# Patient Record
Sex: MALE | Race: WHITE | NOT HISPANIC OR LATINO | Employment: FULL TIME | URBAN - METROPOLITAN AREA
[De-identification: names, ages, dates, MRNs, and addresses within clinical notes are randomized per-mention and may not be internally consistent; named-entity substitution may affect disease eponyms.]

---

## 2021-09-28 ENCOUNTER — APPOINTMENT (OUTPATIENT)
Dept: PREADMISSION TESTING | Facility: HOSPITAL | Age: 56
End: 2021-09-28

## 2021-09-30 ENCOUNTER — PRE-ADMISSION TESTING (OUTPATIENT)
Dept: PREADMISSION TESTING | Facility: HOSPITAL | Age: 56
End: 2021-09-30

## 2021-09-30 VITALS — HEIGHT: 68 IN | WEIGHT: 257.8 LBS | BODY MASS INDEX: 39.07 KG/M2

## 2021-09-30 LAB
ANION GAP SERPL CALCULATED.3IONS-SCNC: 16 MMOL/L (ref 5–15)
BUN SERPL-MCNC: 11 MG/DL (ref 6–20)
BUN/CREAT SERPL: 14.7 (ref 7–25)
CALCIUM SPEC-SCNC: 9.5 MG/DL (ref 8.6–10.5)
CHLORIDE SERPL-SCNC: 101 MMOL/L (ref 98–107)
CO2 SERPL-SCNC: 22 MMOL/L (ref 22–29)
CREAT SERPL-MCNC: 0.75 MG/DL (ref 0.76–1.27)
DEPRECATED RDW RBC AUTO: 42.6 FL (ref 37–54)
ERYTHROCYTE [DISTWIDTH] IN BLOOD BY AUTOMATED COUNT: 11.8 % (ref 12.3–15.4)
GFR SERPL CREATININE-BSD FRML MDRD: 107 ML/MIN/1.73
GLUCOSE SERPL-MCNC: 94 MG/DL (ref 65–99)
HBA1C MFR BLD: 5.1 % (ref 4.8–5.6)
HCT VFR BLD AUTO: 44.4 % (ref 37.5–51)
HGB BLD-MCNC: 15.6 G/DL (ref 13–17.7)
MCH RBC QN AUTO: 34.5 PG (ref 26.6–33)
MCHC RBC AUTO-ENTMCNC: 35.1 G/DL (ref 31.5–35.7)
MCV RBC AUTO: 98.2 FL (ref 79–97)
PLATELET # BLD AUTO: 182 10*3/MM3 (ref 140–450)
PMV BLD AUTO: 9.4 FL (ref 6–12)
POTASSIUM SERPL-SCNC: 4.4 MMOL/L (ref 3.5–5.2)
QT INTERVAL: 418 MS
QTC INTERVAL: 473 MS
RBC # BLD AUTO: 4.52 10*6/MM3 (ref 4.14–5.8)
SODIUM SERPL-SCNC: 139 MMOL/L (ref 136–145)
WBC # BLD AUTO: 4.43 10*3/MM3 (ref 3.4–10.8)

## 2021-09-30 PROCEDURE — 93010 ELECTROCARDIOGRAM REPORT: CPT | Performed by: INTERNAL MEDICINE

## 2021-09-30 PROCEDURE — 80048 BASIC METABOLIC PNL TOTAL CA: CPT

## 2021-09-30 PROCEDURE — 85027 COMPLETE CBC AUTOMATED: CPT

## 2021-09-30 PROCEDURE — 83036 HEMOGLOBIN GLYCOSYLATED A1C: CPT

## 2021-09-30 PROCEDURE — 93005 ELECTROCARDIOGRAM TRACING: CPT

## 2021-09-30 PROCEDURE — 36415 COLL VENOUS BLD VENIPUNCTURE: CPT

## 2021-09-30 RX ORDER — MELOXICAM 15 MG/1
15 TABLET ORAL DAILY
COMMUNITY
Start: 2021-09-20

## 2021-09-30 RX ORDER — OMEPRAZOLE 20 MG/1
20 CAPSULE, DELAYED RELEASE ORAL DAILY
COMMUNITY
Start: 2021-08-23

## 2021-09-30 RX ORDER — LOSARTAN POTASSIUM 25 MG/1
25 TABLET ORAL DAILY
COMMUNITY
Start: 2021-09-01

## 2021-09-30 RX ORDER — PHYTONADIONE 5 MG/1
5 TABLET ORAL DAILY
COMMUNITY

## 2021-09-30 RX ORDER — LIFITEGRAST 50 MG/ML
SOLUTION/ DROPS OPHTHALMIC
COMMUNITY
Start: 2021-09-22 | End: 2022-05-17

## 2021-10-10 ENCOUNTER — APPOINTMENT (OUTPATIENT)
Dept: PREADMISSION TESTING | Facility: HOSPITAL | Age: 56
End: 2021-10-10

## 2021-10-10 LAB — SARS-COV-2 RNA PNL SPEC NAA+PROBE: NOT DETECTED

## 2021-10-10 PROCEDURE — U0004 COV-19 TEST NON-CDC HGH THRU: HCPCS

## 2021-10-10 PROCEDURE — C9803 HOPD COVID-19 SPEC COLLECT: HCPCS

## 2021-10-11 ENCOUNTER — ANESTHESIA EVENT (OUTPATIENT)
Dept: PERIOP | Facility: HOSPITAL | Age: 56
End: 2021-10-11

## 2021-10-11 RX ORDER — FAMOTIDINE 10 MG/ML
20 INJECTION, SOLUTION INTRAVENOUS ONCE
Status: CANCELLED | OUTPATIENT
Start: 2021-10-11 | End: 2021-10-11

## 2021-10-12 ENCOUNTER — ANESTHESIA EVENT CONVERTED (OUTPATIENT)
Dept: ANESTHESIOLOGY | Facility: HOSPITAL | Age: 56
End: 2021-10-12

## 2021-10-12 ENCOUNTER — HOSPITAL ENCOUNTER (OUTPATIENT)
Facility: HOSPITAL | Age: 56
Setting detail: SURGERY ADMIT
Discharge: HOME | End: 2021-10-12
Attending: ORTHOPAEDIC SURGERY | Admitting: ORTHOPAEDIC SURGERY

## 2021-10-12 ENCOUNTER — ANESTHESIA (OUTPATIENT)
Dept: PERIOP | Facility: HOSPITAL | Age: 56
End: 2021-10-12

## 2021-10-12 VITALS
SYSTOLIC BLOOD PRESSURE: 138 MMHG | OXYGEN SATURATION: 94 % | DIASTOLIC BLOOD PRESSURE: 77 MMHG | WEIGHT: 257 LBS | RESPIRATION RATE: 16 BRPM | HEIGHT: 68 IN | TEMPERATURE: 97.1 F | HEART RATE: 94 BPM | BODY MASS INDEX: 38.95 KG/M2

## 2021-10-12 DIAGNOSIS — M75.122 NONTRAUMATIC COMPLETE TEAR OF LEFT ROTATOR CUFF: Primary | ICD-10-CM

## 2021-10-12 PROCEDURE — C1713 ANCHOR/SCREW BN/BN,TIS/BN: HCPCS | Performed by: ORTHOPAEDIC SURGERY

## 2021-10-12 PROCEDURE — 25010000002 ONDANSETRON PER 1 MG: Performed by: NURSE ANESTHETIST, CERTIFIED REGISTERED

## 2021-10-12 PROCEDURE — L3670 SO ACRO/CLAV CAN WEB PRE OTS: HCPCS | Performed by: ORTHOPAEDIC SURGERY

## 2021-10-12 PROCEDURE — 25010000003 CEFAZOLIN IN DEXTROSE 2-4 GM/100ML-% SOLUTION: Performed by: ORTHOPAEDIC SURGERY

## 2021-10-12 PROCEDURE — C1762 CONN TISS, HUMAN(INC FASCIA): HCPCS | Performed by: ORTHOPAEDIC SURGERY

## 2021-10-12 PROCEDURE — 25010000002 PHENYLEPHRINE 10 MG/ML SOLUTION 1 ML VIAL: Performed by: NURSE ANESTHETIST, CERTIFIED REGISTERED

## 2021-10-12 PROCEDURE — C1889 IMPLANT/INSERT DEVICE, NOC: HCPCS | Performed by: ORTHOPAEDIC SURGERY

## 2021-10-12 PROCEDURE — 25010000002 EPINEPHRINE PER 0.1 MG: Performed by: ORTHOPAEDIC SURGERY

## 2021-10-12 PROCEDURE — 25010000002 ROPIVACINE HCL-NACL: Performed by: NURSE ANESTHETIST, CERTIFIED REGISTERED

## 2021-10-12 PROCEDURE — 25010000002 DEXAMETHASONE PER 1 MG: Performed by: NURSE ANESTHETIST, CERTIFIED REGISTERED

## 2021-10-12 PROCEDURE — 25010000002 PROPOFOL 10 MG/ML EMULSION: Performed by: NURSE ANESTHETIST, CERTIFIED REGISTERED

## 2021-10-12 PROCEDURE — 25010000002 NEOSTIGMINE 10 MG/10ML SOLUTION: Performed by: NURSE ANESTHETIST, CERTIFIED REGISTERED

## 2021-10-12 PROCEDURE — 25010000002 FENTANYL CITRATE (PF) 50 MCG/ML SOLUTION: Performed by: NURSE ANESTHETIST, CERTIFIED REGISTERED

## 2021-10-12 DEVICE — SUT FW #2 W/TPR NDL 1/2 CIR 38IN 97CM 26.5MM BLU: Type: IMPLANTABLE DEVICE | Site: SHOULDER | Status: FUNCTIONAL

## 2021-10-12 DEVICE — SUT/ANCH HEALIXADVANCE BR DYNATAPE 5.5MM BLK/BLU WHT/BLU/GRN: Type: IMPLANTABLE DEVICE | Site: SHOULDER | Status: FUNCTIONAL

## 2021-10-12 DEVICE — SUT/ANCH HEALIXADVANCE BR W/DYNATAPE 5.5MM WHT/BLK BLU: Type: IMPLANTABLE DEVICE | Site: SHOULDER | Status: FUNCTIONAL

## 2021-10-12 DEVICE — SUT/ANCH HEALIX ADVANCE BIOCOMP SP 5.5MM: Type: IMPLANTABLE DEVICE | Site: SHOULDER | Status: FUNCTIONAL

## 2021-10-12 DEVICE — IMPLANTABLE DEVICE: Type: IMPLANTABLE DEVICE | Site: SHOULDER | Status: FUNCTIONAL

## 2021-10-12 DEVICE — HEALICOIL KNOTLESS RGNST
Type: IMPLANTABLE DEVICE | Site: SHOULDER | Status: FUNCTIONAL
Brand: HEALICOIL

## 2021-10-12 RX ORDER — SODIUM CHLORIDE, SODIUM LACTATE, POTASSIUM CHLORIDE, CALCIUM CHLORIDE 600; 310; 30; 20 MG/100ML; MG/100ML; MG/100ML; MG/100ML
100 INJECTION, SOLUTION INTRAVENOUS CONTINUOUS
Status: DISCONTINUED | OUTPATIENT
Start: 2021-10-12 | End: 2021-10-12 | Stop reason: HOSPADM

## 2021-10-12 RX ORDER — PROPOFOL 10 MG/ML
VIAL (ML) INTRAVENOUS AS NEEDED
Status: DISCONTINUED | OUTPATIENT
Start: 2021-10-12 | End: 2021-10-12 | Stop reason: SURG

## 2021-10-12 RX ORDER — HYDROCODONE BITARTRATE AND ACETAMINOPHEN 10; 325 MG/1; MG/1
1 TABLET ORAL EVERY 4 HOURS PRN
Qty: 24 TABLET | Refills: 0 | Status: SHIPPED | OUTPATIENT
Start: 2021-10-12 | End: 2022-05-17

## 2021-10-12 RX ORDER — BUPIVACAINE HCL/0.9 % NACL/PF 0.125 %
PLASTIC BAG, INJECTION (ML) EPIDURAL AS NEEDED
Status: DISCONTINUED | OUTPATIENT
Start: 2021-10-12 | End: 2021-10-12 | Stop reason: SURG

## 2021-10-12 RX ORDER — GLYCOPYRROLATE 0.2 MG/ML
INJECTION INTRAMUSCULAR; INTRAVENOUS AS NEEDED
Status: DISCONTINUED | OUTPATIENT
Start: 2021-10-12 | End: 2021-10-12 | Stop reason: SURG

## 2021-10-12 RX ORDER — NEOSTIGMINE METHYLSULFATE 1 MG/ML
INJECTION, SOLUTION INTRAVENOUS AS NEEDED
Status: DISCONTINUED | OUTPATIENT
Start: 2021-10-12 | End: 2021-10-12 | Stop reason: SURG

## 2021-10-12 RX ORDER — HYDROMORPHONE HYDROCHLORIDE 1 MG/ML
0.5 INJECTION, SOLUTION INTRAMUSCULAR; INTRAVENOUS; SUBCUTANEOUS
Status: DISCONTINUED | OUTPATIENT
Start: 2021-10-12 | End: 2021-10-12 | Stop reason: HOSPADM

## 2021-10-12 RX ORDER — ONDANSETRON 4 MG/1
4 TABLET, FILM COATED ORAL EVERY 8 HOURS PRN
Qty: 12 TABLET | Refills: 0 | Status: SHIPPED | OUTPATIENT
Start: 2021-10-12 | End: 2022-05-17

## 2021-10-12 RX ORDER — EPHEDRINE SULFATE 50 MG/ML
5 INJECTION, SOLUTION INTRAVENOUS ONCE AS NEEDED
Status: DISCONTINUED | OUTPATIENT
Start: 2021-10-12 | End: 2021-10-12 | Stop reason: HOSPADM

## 2021-10-12 RX ORDER — FENTANYL CITRATE 50 UG/ML
INJECTION, SOLUTION INTRAMUSCULAR; INTRAVENOUS
Status: COMPLETED | OUTPATIENT
Start: 2021-10-12 | End: 2021-10-12

## 2021-10-12 RX ORDER — MULTIVIT WITH MINERALS/LUTEIN
2000 TABLET ORAL DAILY
COMMUNITY
End: 2022-05-17

## 2021-10-12 RX ORDER — ONDANSETRON 2 MG/ML
INJECTION INTRAMUSCULAR; INTRAVENOUS AS NEEDED
Status: DISCONTINUED | OUTPATIENT
Start: 2021-10-12 | End: 2021-10-12 | Stop reason: SURG

## 2021-10-12 RX ORDER — FENTANYL CITRATE 50 UG/ML
50 INJECTION, SOLUTION INTRAMUSCULAR; INTRAVENOUS
Status: DISCONTINUED | OUTPATIENT
Start: 2021-10-12 | End: 2021-10-12 | Stop reason: HOSPADM

## 2021-10-12 RX ORDER — LORATADINE 10 MG/1
10 CAPSULE, LIQUID FILLED ORAL EVERY MORNING
COMMUNITY

## 2021-10-12 RX ORDER — FAMOTIDINE 20 MG/1
20 TABLET, FILM COATED ORAL ONCE
Status: COMPLETED | OUTPATIENT
Start: 2021-10-12 | End: 2021-10-12

## 2021-10-12 RX ORDER — MIDAZOLAM HYDROCHLORIDE 1 MG/ML
1 INJECTION INTRAMUSCULAR; INTRAVENOUS
Status: DISCONTINUED | OUTPATIENT
Start: 2021-10-12 | End: 2021-10-12 | Stop reason: HOSPADM

## 2021-10-12 RX ORDER — BUPIVACAINE HYDROCHLORIDE 2.5 MG/ML
INJECTION, SOLUTION EPIDURAL; INFILTRATION; INTRACAUDAL
Status: COMPLETED | OUTPATIENT
Start: 2021-10-12 | End: 2021-10-12

## 2021-10-12 RX ORDER — ONDANSETRON 2 MG/ML
4 INJECTION INTRAMUSCULAR; INTRAVENOUS ONCE AS NEEDED
Status: DISCONTINUED | OUTPATIENT
Start: 2021-10-12 | End: 2021-10-12 | Stop reason: HOSPADM

## 2021-10-12 RX ORDER — PREGABALIN 75 MG/1
75 CAPSULE ORAL ONCE
Status: COMPLETED | OUTPATIENT
Start: 2021-10-12 | End: 2021-10-12

## 2021-10-12 RX ORDER — ACETAMINOPHEN 500 MG
1000 TABLET ORAL ONCE
Status: COMPLETED | OUTPATIENT
Start: 2021-10-12 | End: 2021-10-12

## 2021-10-12 RX ORDER — SODIUM CHLORIDE 0.9 % (FLUSH) 0.9 %
10 SYRINGE (ML) INJECTION AS NEEDED
Status: DISCONTINUED | OUTPATIENT
Start: 2021-10-12 | End: 2021-10-12 | Stop reason: HOSPADM

## 2021-10-12 RX ORDER — ROCURONIUM BROMIDE 10 MG/ML
INJECTION, SOLUTION INTRAVENOUS AS NEEDED
Status: DISCONTINUED | OUTPATIENT
Start: 2021-10-12 | End: 2021-10-12 | Stop reason: SURG

## 2021-10-12 RX ORDER — DOCUSATE SODIUM 100 MG/1
100 CAPSULE, LIQUID FILLED ORAL 2 TIMES DAILY
Qty: 20 CAPSULE | Refills: 0 | Status: SHIPPED | OUTPATIENT
Start: 2021-10-12 | End: 2022-05-17

## 2021-10-12 RX ORDER — DEXAMETHASONE SODIUM PHOSPHATE 4 MG/ML
INJECTION, SOLUTION INTRA-ARTICULAR; INTRALESIONAL; INTRAMUSCULAR; INTRAVENOUS; SOFT TISSUE AS NEEDED
Status: DISCONTINUED | OUTPATIENT
Start: 2021-10-12 | End: 2021-10-12 | Stop reason: SURG

## 2021-10-12 RX ORDER — LIDOCAINE HYDROCHLORIDE 10 MG/ML
0.5 INJECTION, SOLUTION EPIDURAL; INFILTRATION; INTRACAUDAL; PERINEURAL ONCE AS NEEDED
Status: COMPLETED | OUTPATIENT
Start: 2021-10-12 | End: 2021-10-12

## 2021-10-12 RX ORDER — SODIUM CHLORIDE, SODIUM LACTATE, POTASSIUM CHLORIDE, CALCIUM CHLORIDE 600; 310; 30; 20 MG/100ML; MG/100ML; MG/100ML; MG/100ML
9 INJECTION, SOLUTION INTRAVENOUS CONTINUOUS
Status: DISCONTINUED | OUTPATIENT
Start: 2021-10-12 | End: 2021-10-12 | Stop reason: HOSPADM

## 2021-10-12 RX ORDER — NALOXONE HCL 0.4 MG/ML
0.4 VIAL (ML) INJECTION AS NEEDED
Status: DISCONTINUED | OUTPATIENT
Start: 2021-10-12 | End: 2021-10-12 | Stop reason: HOSPADM

## 2021-10-12 RX ORDER — SODIUM CHLORIDE 0.9 % (FLUSH) 0.9 %
10 SYRINGE (ML) INJECTION EVERY 12 HOURS SCHEDULED
Status: DISCONTINUED | OUTPATIENT
Start: 2021-10-12 | End: 2021-10-12 | Stop reason: HOSPADM

## 2021-10-12 RX ORDER — LIDOCAINE HYDROCHLORIDE 10 MG/ML
INJECTION, SOLUTION EPIDURAL; INFILTRATION; INTRACAUDAL; PERINEURAL AS NEEDED
Status: DISCONTINUED | OUTPATIENT
Start: 2021-10-12 | End: 2021-10-12 | Stop reason: SURG

## 2021-10-12 RX ORDER — CEFAZOLIN SODIUM 2 G/100ML
2 INJECTION, SOLUTION INTRAVENOUS ONCE
Status: COMPLETED | OUTPATIENT
Start: 2021-10-12 | End: 2021-10-12

## 2021-10-12 RX ORDER — MEPERIDINE HYDROCHLORIDE 25 MG/ML
12.5 INJECTION INTRAMUSCULAR; INTRAVENOUS; SUBCUTANEOUS
Status: DISCONTINUED | OUTPATIENT
Start: 2021-10-12 | End: 2021-10-12 | Stop reason: HOSPADM

## 2021-10-12 RX ADMIN — PREGABALIN 75 MG: 75 CAPSULE ORAL at 08:58

## 2021-10-12 RX ADMIN — FENTANYL CITRATE 100 MCG: 50 INJECTION, SOLUTION INTRAMUSCULAR; INTRAVENOUS at 09:30

## 2021-10-12 RX ADMIN — Medication 100 MCG: at 11:01

## 2021-10-12 RX ADMIN — ROCURONIUM BROMIDE 50 MG: 10 INJECTION, SOLUTION INTRAVENOUS at 10:20

## 2021-10-12 RX ADMIN — BUPIVACAINE HYDROCHLORIDE 20 ML: 2.5 INJECTION, SOLUTION EPIDURAL; INFILTRATION; INTRACAUDAL at 09:30

## 2021-10-12 RX ADMIN — LIDOCAINE HYDROCHLORIDE 0.5 ML: 10 INJECTION, SOLUTION EPIDURAL; INFILTRATION; INTRACAUDAL; PERINEURAL at 08:58

## 2021-10-12 RX ADMIN — LIDOCAINE HYDROCHLORIDE 50 MG: 10 INJECTION, SOLUTION EPIDURAL; INFILTRATION; INTRACAUDAL; PERINEURAL at 10:20

## 2021-10-12 RX ADMIN — Medication 100 MCG: at 11:08

## 2021-10-12 RX ADMIN — PHENYLEPHRINE HYDROCHLORIDE 0.25 MCG/KG/MIN: 10 INJECTION INTRAVENOUS at 11:28

## 2021-10-12 RX ADMIN — ROPIVACAINE HYDROCHLORIDE 6 ML/HR: 2 INJECTION, SOLUTION EPIDURAL; INFILTRATION at 12:43

## 2021-10-12 RX ADMIN — DEXAMETHASONE SODIUM PHOSPHATE 8 MG: 4 INJECTION, SOLUTION INTRA-ARTICULAR; INTRALESIONAL; INTRAMUSCULAR; INTRAVENOUS; SOFT TISSUE at 10:20

## 2021-10-12 RX ADMIN — PROPOFOL 200 MG: 10 INJECTION, EMULSION INTRAVENOUS at 10:20

## 2021-10-12 RX ADMIN — FAMOTIDINE 20 MG: 20 TABLET ORAL at 08:58

## 2021-10-12 RX ADMIN — CEFAZOLIN SODIUM 2 G: 2 INJECTION, SOLUTION INTRAVENOUS at 10:13

## 2021-10-12 RX ADMIN — ONDANSETRON 4 MG: 2 INJECTION INTRAMUSCULAR; INTRAVENOUS at 12:36

## 2021-10-12 RX ADMIN — NEOSTIGMINE METHYLSULFATE 3 MG: 0.5 INJECTION INTRAVENOUS at 12:38

## 2021-10-12 RX ADMIN — Medication 100 MCG: at 10:50

## 2021-10-12 RX ADMIN — ACETAMINOPHEN 1000 MG: 500 TABLET ORAL at 08:57

## 2021-10-12 RX ADMIN — SODIUM CHLORIDE, POTASSIUM CHLORIDE, SODIUM LACTATE AND CALCIUM CHLORIDE 9 ML/HR: 600; 310; 30; 20 INJECTION, SOLUTION INTRAVENOUS at 08:58

## 2021-10-12 RX ADMIN — GLYCOPYRROLATE 0.4 MG: 0.2 INJECTION INTRAMUSCULAR; INTRAVENOUS at 12:38

## 2021-10-12 NOTE — H&P
Pre-Op H&P  Michel Lopez II  [id number]  1965      Chief complaint: Left shoulder pain      Subjective:  Patient is a 56 y.o.male presents for scheduled surgery by Dr. Hernández. He anticipates a ARTHROSCOPY SHOULDER SUPERIOR CAPSULAR RECONSTRUCTION AND BICEPS TENODESIS LEFT today. His shoulder has been painful with limited ROM for about 4 years. He tried PT and injections with minimal benefit. He denies use of assistive device for ambulation. He has occasional numbness left hand.      Review of Systems:  Constitutional-- No fever, chills or sweats. No fatigue.  CV-- No chest pain, palpitation or syncope. +HTN  Resp-- No SOB, cough, hemoptysis. +JINA no cpap  Skin--No rashes or lesions      Allergies:   Allergies   Allergen Reactions   • Penicillins Hives         Home Meds:  Medications Prior to Admission   Medication Sig Dispense Refill Last Dose   • Loratadine (Claritin) 10 MG capsule Take 10 mg by mouth.   10/11/2021 at Unknown time   • losartan (COZAAR) 25 MG tablet Take 25 mg by mouth Daily.   10/11/2021 at Unknown time   • metoprolol tartrate (LOPRESSOR) 25 MG tablet Take 50 mg by mouth 2 (Two) Times a Day. 1/2 PILL   10/11/2021 at 2300   • Multiple Vitamins-Minerals (Multivitamin Adult, Minerals,) tablet Take  by mouth.   10/11/2021 at Unknown time   • Omega-3 Fatty Acids (SALMON OIL-1000 PO) Take 2,000 mg by mouth.   10/8/2021   • omeprazole (priLOSEC) 20 MG capsule TAKE 1 CAPSULE BY MOUTH EVERY DAY 30 MINUTES BEFORE A MEAL   10/11/2021 at Unknown time   • vitamin C (ASCORBIC ACID) 250 MG tablet Take 2,000 mg by mouth Daily.   10/11/2021 at Unknown time   • vitamin D3 125 MCG (5000 UT) capsule capsule Take 5,000 Units by mouth Daily.   10/11/2021 at Unknown time   • Xiidra 5 % ophthalmic solution INSTILL 1 VIAL IN EACH EYE DAILY   10/12/2021 at Unknown time   • meloxicam (MOBIC) 15 MG tablet Take 15 mg by mouth Daily.   10/8/2021   • phytonadione (MEPHYTON, VITAMIN K) 5 MG tablet Take 5 mg by mouth  "1 (One) Time.   10/7/2021         PMH:   Past Medical History:   Diagnosis Date   • Arthritis     shoulders   • Hypertension    • Sleep apnea     Does not use CPAP machine.    • Wears glasses      PSH:    Past Surgical History:   Procedure Laterality Date   • AMPUTATION FINGER / THUMB      reattached  4 fingers including thumb in 12/1993 in work accident   • COLONOSCOPY      last colonoscopy was with in last 5 years   • KNEE ARTHROPLASTY, PARTIAL REPLACEMENT      left   • SHOULDER ARTHROSCOPY      right       Immunization History:  Influenza: No  Pneumococcal: No  Tetanus: UTD  Covid x2: 2021    Social History:   Tobacco:   Social History     Tobacco Use   Smoking Status Former Smoker   • Years: 8.00   • Types: Cigars   Smokeless Tobacco Never Used   Tobacco Comment    quit 4 years ago      Alcohol:     Social History     Substance and Sexual Activity   Alcohol Use Yes   • Alcohol/week: 8.0 standard drinks   • Types: 8 Shots of liquor per week    Comment: 8 shots in a day          Physical Exam:/98 (BP Location: Right arm, Patient Position: Lying)   Pulse 71   Temp 97.7 °F (36.5 °C) (Temporal)   Resp 18   Ht 172.7 cm (68\")   Wt 117 kg (257 lb)   SpO2 94%   BMI 39.08 kg/m²       General Appearance:    Alert, cooperative, no distress, appears stated age   Head:    Normocephalic, without obvious abnormality, atraumatic   Lungs:     Clear to auscultation bilaterally, respirations unlabored    Heart:   Regular rate and rhythm, S1 and S2 normal    Abdomen:    Soft without tenderness   Extremities:   Extremities normal, atraumatic, no cyanosis or edema   Skin:   Skin color, texture, turgor normal, no rashes or lesions   Neurologic:   Grossly intact     Results Review:     LABS:  Lab Results   Component Value Date    WBC 4.43 09/30/2021    HGB 15.6 09/30/2021    HCT 44.4 09/30/2021    MCV 98.2 (H) 09/30/2021     09/30/2021    GLUCOSE 94 09/30/2021    BUN 11 09/30/2021    CREATININE 0.75 (L) 09/30/2021    " EGFRIFNONA 107 09/30/2021     09/30/2021    K 4.4 09/30/2021     09/30/2021    CO2 22.0 09/30/2021    CALCIUM 9.5 09/30/2021       RADIOLOGY:  Imaging Results (Last 72 Hours)     ** No results found for the last 72 hours. **          I reviewed the patient's new clinical results.    Cancer Staging (if applicable)  Cancer Patient: __ yes __no __unknown; If yes, clinical stage T:__ N:__M:__, stage group or __N/A      Impression: Left rotator cuff syndrome       Plan: ARTHROSCOPY SHOULDER SUPERIOR CAPSULAR RECONSTRUCTION AND BICEPS TENODESIS LEFT      DANIEL Mayer   10/12/2021   09:06 EDT

## 2021-10-12 NOTE — ANESTHESIA PROCEDURE NOTES
Airway  Urgency: elective    Date/Time: 10/12/2021 10:21 AM  Airway not difficult    General Information and Staff    Patient location during procedure: OR  CRNA: Devan Covarrubias CRNA    Indications and Patient Condition  Indications for airway management: airway protection    Preoxygenated: yes  MILS not maintained throughout  Mask difficulty assessment: 1 - vent by mask    Final Airway Details  Final airway type: endotracheal airway      Successful airway: ETT  Cuffed: yes   Successful intubation technique: direct laryngoscopy  Endotracheal tube insertion site: oral  Blade: Marie  Blade size: 2  ETT size (mm): 7.5  Cormack-Lehane Classification: grade I - full view of glottis  Placement verified by: chest auscultation and capnometry   Cuff volume (mL): 5  Measured from: lips  ETT/EBT  to lips (cm): 23  Number of attempts at approach: 1  Assessment: lips, teeth, and gum same as pre-op and atraumatic intubation    Additional Comments  Negative epigastric sounds, Breath sound equal bilaterally with symmetric chest rise and fall

## 2021-10-12 NOTE — ANESTHESIA PREPROCEDURE EVALUATION
Anesthesia Evaluation     Patient summary reviewed and Nursing notes reviewed   no history of anesthetic complications:  NPO Solid Status: > 8 hours  NPO Liquid Status: > 2 hours           Airway   Mallampati: II  TM distance: >3 FB  Neck ROM: full  No difficulty expected  Dental - normal exam     Pulmonary - normal exam    breath sounds clear to auscultation  (+) a smoker (Vapes), sleep apnea (Non-compliant with CPAP),   Cardiovascular - normal exam    ECG reviewed  Rhythm: regular  Rate: normal    (+) hypertension,       Neuro/Psych- negative ROS  GI/Hepatic/Renal/Endo    (+) morbid obesity,      Musculoskeletal     Abdominal    Substance History   (+) alcohol use (5-6 drinks/day),      OB/GYN          Other   arthritis,                      Anesthesia Plan    ASA 3     general with block   (Left adductor canal block/catheter with arrow pump for post-operative analgesia per request of Dr. Hernández.  Time out performed to verify patient, surgeon, and surgical site.)  intravenous induction     Anesthetic plan, all risks, benefits, and alternatives have been provided, discussed and informed consent has been obtained with: patient.    Plan discussed with CRNA.

## 2021-10-12 NOTE — ANESTHESIA PROCEDURE NOTES
Peripheral Block      Patient reassessed immediately prior to procedure    Patient location during procedure: pre-op  Reason for block: at surgeon's request and post-op pain management  Performed by  Anesthesiologist: Nikko Granados MD  CRNA: Elizabeth Green CRNA  Preanesthetic Checklist  Completed: patient identified, IV checked, site marked, risks and benefits discussed, surgical consent, monitors and equipment checked, pre-op evaluation and timeout performed  Prep:  Sterile barriers:cap, gloves, mask and sterile barriers  Prep: ChloraPrep  Patient monitoring: blood pressure monitoring, continuous pulse oximetry and EKG  Procedure  Sedation:yes  Performed under: local infiltration  Guidance:ultrasound guided  ULTRASOUND INTERPRETATION. Using ultrasound guidance a gauge needle was placed in close proximity to the brachial plexus nerve, at which point, under ultrasound guidance anesthetic was injected in the area of the nerve and spread of the anesthesia was seen on ultrasound in close proximity thereto.  There were no abnormalities seen on ultrasound; a digital image was taken; and the patient tolerated the procedure with no complications. Images:still images obtained, printed/placed on chart    Laterality:left  Block Type:interscalene  Injection Technique:catheter  Needle Type:Tuohy and echogenic  Needle Gauge:18 G  Resistance on Injection: none  Catheter Size:20 G (20g)  Cath Depth at skin: 7 cm    Medications Used: fentaNYL citrate (PF) (SUBLIMAZE) injection, 100 mcg  bupivacaine PF (MARCAINE) 0.25 % injection, 20 mL  Med admintered at 10/12/2021 9:30 AM      Post Assessment  Injection Assessment: negative aspiration for heme, no paresthesia on injection and incremental injection  Patient Tolerance:comfortable throughout block  Complications:no  Additional Notes  Procedure:                 The pt was placed in semifowlers position with a slight tilt of the thorax contralateral to the insertion site.  The  Insertion Site was prepped and draped in sterile fashion.  The pt was anesthetized with  IV Sedation( see meds) and  Skin and cutaneous tissue was infiltrated and anesthetized with 1% Lidocaine 3 mls via a 25g needle.  Utilizing ultrasound guidance, a BBraun 4 inch 18 g Contiplex echogenic touhy needle was advanced in-plane.  Hydro dissection of tissue was achieved with Normal saline. Major vessels(carotid and Internal Jugular) where visualized as the brachial plexus was approached at the approximate level of C-7/ T-1.  Cervical 5 and Branches of Cervical 6 nerve roots where visualized and the needle tip was placed posterior at the level of C-6 roots.  LA spread was visualized and injection was made incrementally every 5 mls with aspiration. Injection pressure was normal or little, there was no intraneural injection, no vascular injection.      The BBraun 20 g wire stylet catheter was then placed under US guidance on the posterior aspect of the Brachial Plexus. The tuohy was removed and the location of catheter was confirmed with NS injection visualized with US . The skin was sealed with exofin tissue adhesive at catheter insertion site.  Skin was prepped with benzoin and the catheter was secured with steristrips and a CHG tegaderm. Appropriate labels applied. Thank You.

## 2021-10-12 NOTE — OP NOTE
ARTHROSCOPY SHOULDER SUPERIOR CAPSULAR RECONSTRUCTION  Procedure Report    Patient Name:  Michel Lopez II  YOB: 1965    Date of Surgery:  10/12/2021     Indications:  This is a 56 year old male with longstanding history of left shoulder pain that has failed to respond to conservative measures. Clinical and radiographic findings were consistent with a rotator cuff tear. We discussed the risks and benefits of performing a rotator cuff repair with possible open biceps tenodesis. The risks and benefits were discussed with the patient to include, but not limited to: bleeding, infection, nerve injury, failure of fixation, stiffness, need for further procedures, loss of limb or life. The patient expressed understanding and wished to proceed with the procedure.      Pre-op Diagnosis:        Left rotator cuff tear       Post-op Diagnosis:         Left massive rotator cuff tear    Procedure/CPT® Codes:  86363-20.  Please note that this rotator cuff repair was 300% more difficult than standard rotator cuff repair given the tear size, degree of retraction, requirement for 6 anchors for full repair and need for dermis graft for biologic healing.    Procedure(s):  Left massive rotator cuff repair    Staff:  Surgeon(s):  Obi Hernández Jr., MD    Circulator: Zenaida Jimenes RN; Alfred Florez RN  Scrub Person: Rubens Cohen; Tony Spear  Vendor Representative: Elpidio Duran  Nursing Assistant: Daniel Bashir PCT; Geraldine Marie  Assistant: Katelyn Davis PA-C     Assistant: Katelyn Davis PA-C  was responsible for performing the following activities: Retraction, Suction, Irrigation, Suturing, Closing and Placing Dressing and their skilled assistance was necessary for the success of this case.    Anesthesia: General with Block    Estimated Blood Loss: 25 mL    Implants:    Implant Name Type Inv. Item Serial No.  Lot No. LRB No. Used Action   SUT/ANCH HEALIXADVANCE BR  DYNATAPE 5.5MM BLK/ANABELLA WHT/ANABELLA/GRN - [id number] Implant SUT/ANCH HEALIXADVANCE BR DYNATAPE 5.5MM BLK/ANABELLA WHT/ANABELLA/GRN  DEPUY MITEK [id number] Left 1 Implanted   SUT/ANCH HEALIX ADVANCE BIOCOMP SP 5.5MM - [id number] Implant SUT/ANCH HEALIX ADVANCE BIOCOMP SP 5.5MM  DEPUY MITEK [id number] Left 1 Implanted   SUT FW #2 W/TPR NDL 1/2 CIR 38IN 97CM 26.5MM ANABELLA - [id number] Implant SUT FW #2 W/TPR NDL 1/2 CIR 38IN 97CM 26.5MM ANABELLA  ARTHREX  Left 3 Implanted   SUT/ANCH HEALIXADVANCE BR W/DYNATAPE 5.5MM WHT/BLK ANABELLA - [id number] Implant SUT/ANCH HEALIXADVANCE BR W/DYNATAPE 5.5MM WHT/BLK ANABELLA  DEPUY MITEK [id number] Left 1 Implanted   SUT/ANCH HEALIXADVANCE BR DYNATAPE 5.5MM BLK/ANABELLA WHT/ANABELLA/GRN - [id number] Implant SUT/ANCH HEALIXADVANCE BR DYNATAPE 5.5MM BLK/ANABELLA WHT/ANABELLA/GRN  DEPUY MITEK [id number] Left 1 Implanted   SUT/DARSHAN KNOTLSS HEALICOIL REGENESORB 5.5MM - [id number] Implant SUT/DARSHAN KNOTLSS HEALICOIL REGENESORB 5.5MM  KO AND NEPHEW [id number] Left 1 Implanted   SUT/DARSHAN HEALIX ADVANCE BIOCOMP SP 5.5MM - [id number] Implant SUT/DARSHAN HEALIX ADVANCE BIOCOMP SP 5.5MM  DEPUY MITEK [id number] Left 1 Implanted   ALLOGRFT HUMN DERMIS ON DEMAND 15X4.5X3MM - [id number] Implant ALLOGRFT HUMN DERMIS ON DEMAND 15X4.5X3MM  TISSUE REGENIX [id number] Left 1 Implanted       Specimen:                None      Findings: Massive rotator cuff tear involving the upper half of the subscapularis, supraspinatus, and infraspinatus with retraction to the level of the glenoid.  Long head of the biceps tendon had completely ruptured previously.  Early cuff tear arthropathy present with softening of superior humeral head.    Complications: none apparent    Description of Procedure: After informed consent had been obtained, the left upper extremity was identified as the correct surgical extremity and marked. The patient then received a pre-operative interscalene peripheral nerve block. The patient was then taken back to the operating suite and was placed on the  T-max operating table. General anesthesia was induced and the patient was intubated. The patient was then placed in the beachchair position. The surgical extremity was then examined under anesthesia, demonstrating forward elevation of 170 and external rotation of 90. The surgical extremity was then prepped and draped in the usual, sterile fashion.     We began the procedure by making a 1 cm incision approximately 2 cm inferior and 1 cm medial to the posterolateral aspect of the acromion. The trochar for the arthroscope was introduced into the glenohumeral joint and the arthroscope was then inserted to begin our diagnostic arthroscopy. An anterior portal was created using an outside-in technique.  We then identified the subscapularis tendon which had retracted to the level of the glenoid along with the supraspinatus and infraspinatus.  We established a lateral portal through an outside in technique as well, and then placed 1 traction suture through the comma tissue which was still intact.  Lateral traction was applied to the anterior and superior cuff and a 270 degree release as well as a 360 degree capsule release were performed to improve excursion of the anterior superior and posterior cuff tendons.  We then debrided the lesser tuberosity of any remaining soft tissue.  The biceps stump was debrided back to stable margins, as well.  We then inserted a medial row anchor just along the medial aspect of the lesser tuberosity.  2 #2 tapes and two #2 sutures were passed through the subscapularis tendon.  The 2 #2 tapes were then tensioned, and our #2 sutures, which had been passed in a horizontal mattress fashion through the upper, rolled border of the subscapularis, were tied with an SMC knot with 3 alternating half inch knots, reducing the medial aspect of the tendon back to the lesser tuberosity.  All 4 limbs of suture were then attached to a Mitek Healix lateral row self-tapping anchor.  The anchor was introduced  and malleted into the biceps groove.  The suture limbs were then tightened, reducing the remainder of the tendon back to the footprint and are anchor was then tightened into place achieving excellent reduction of the subscapularis.  We then turned our attention to the subacromial space.    The arthroscope was then inserted into the subacromial space. An extensive amount of subacromial bursitis was removed using an arthroscopic shaver and RFA wand. The cuff tear was then identified and debrided back to stable margins.  We then performed further releases to improve excursion of the superior cuff which was a reverse L-shaped tear.  To reduce the infraspinatus back to the teres minor, to margin convergence sutures were passed through the teres and infraspinatus.  These were both tied with an SMC knot with 3 alternating half inch knots turning a reverse L tear into a large U-shaped tear. the tuberosity was prepared, first removing the soft tissue and then smoothing the surface for an appropriate bed for the tendon to lie on the footprint.  We did medialize the footprint approximately 4 mm to take tension off of our superior cuff repair.  We then inserted 2 Medial Row anchors.  All 8 limbs of sutures from 2 Medial Row anchors were then passed using an antegrade passer.  The two #2 sutures from each anchor were then tied with an SMC knot with 3 alternating half inch knots to reduce the medial aspect of the tendon back to the footprint.  We then attached alternate limbs of suture to each lateral row anchor that were then prepared and then introduced.  Each anchor was then malleted into place.  We then tensioned our sutures to reduce the cuff back to the footprint and tightened our anchor into place, achieving excellent reduction of our superior cuff tissue.  Due to the large tear size, we did choose to add 1 dermis on demand graft to the anterior aspect of repair to improve biologic healing.    Final arthroscopic photos  were taken, demonstrating excellent reduction of the cuff tissue.     Our arthroscopic portals were then closed with 3-0 monocryl suture in a buried fashion.  Steri-Strips were then applied to her portal sites.  A sterile dressing was then applied and the patient was placed in a sling with an abduction pillow. The patient was then awakened from anesthesia, extubated, transferred to the hospitals, and transferred to the post anesthesia care unit in stable condition.    The plan for Mr. Damon is to follow a massive rotator cuff repair protocol.  He will follow-up in 2 weeks for wound check but begin physical therapy at 4 weeks.    Obi Hernández Jr., MD     Date: 10/12/2021  Time: 12:57 EDT

## 2021-10-12 NOTE — ANESTHESIA POSTPROCEDURE EVALUATION
Patient: Michel Gregory Ensor II    Procedure Summary     Date: 10/12/21 Room / [facility]:  [facility]    Anesthesia Start: 1013 Anesthesia Stop:     Procedure: ARTHROSCOPY SHOULDER SUPERIOR CAPSULAR RECONSTRUCTION AND BICEPS TENODESIS LEFT (Left Shoulder) Diagnosis:     Surgeons: Obi Hernández Jr., MD Provider: Nikko Granados MD    Anesthesia Type: general with block ASA Status: 3          Anesthesia Type: general with block    Vitals  No vitals data found for the desired time range.          Post Anesthesia Care and Evaluation    Patient location during evaluation: PACU  Patient participation: complete - patient participated  Level of consciousness: awake and alert  Pain score: 0  Pain management: adequate  Airway patency: patent  Anesthetic complications: No anesthetic complications  PONV Status: none  Cardiovascular status: hemodynamically stable and acceptable  Respiratory status: nonlabored ventilation, acceptable and nasal cannula  Hydration status: acceptable

## 2021-10-13 NOTE — PROGRESS NOTES
[facility]    Nerve Cath Post Op Call    Patient Name: Michel Lopez II  :  1965  MRN:  [id number]  Date of Discharge: 10/12/2021    Nerve Cath Post Op Call:    Analgesia:Good  Side Effects:None  Catheter Site:clean  Patient Controlled ON Q pump infusion rate: 10ml/hr  Catheter Plan:Will continue with plan at home without changes and The patient was instructed to call ON CALL Anesthesia provider for any questions or problems  Patient/Family instructed to call ON CALL anesthesia provider for any questions or problems.  Patient Follow Up:                      Pt instructed to reinforce dressing as needed

## 2021-10-14 NOTE — PROGRESS NOTES
[facility]    Nerve Cath Post Op Call    Patient Name: Michel Lopez II  :  1965  MRN:  [id number]  Date of Discharge: 10/12/2021    Nerve Cath Post Op Call:    Analgesia:Good  Pain Score:3/10  Side Effects:None  Catheter Site:clean  Patient Controlled ON Q pump infusion rate: 6ml/hr  Catheter Plan:Will continue with plan at home without changes and The patient was instructed to call ON CALL Anesthesia provider for any questions or problems  Patient/Family instructed to call ON CALL anesthesia provider for any questions or problems.  Patient Follow Up:

## 2021-10-15 NOTE — PROGRESS NOTES
[facility]    Nerve Cath Post Op Call    Patient Name: Michel Lopez II  :  1965  MRN:  [id number]  Date of Discharge: 10/12/2021    Nerve Cath Post Op Call:    Analgesia:Good  Pain Score:5/10  Side Effects:None  Catheter Site:clean  Patient Controlled ON Q pump infusion rate: 6ml/hr  Catheter Plan:Will continue with plan at home without changes and The patient was instructed to call ON CALL Anesthesia provider for any questions or problems  Patient/Family instructed to call ON CALL anesthesia provider for any questions or problems.  Patient Follow Up:    Patient states is having increased pain on top of the shoulder, no breathing issues and able to wiggle fingers. Pt was instructed to increase the Arrow pump to rate of 10mL/hr for two hours and then turn it back down to rate of 6ml/hr. Patient states understanding.

## 2022-05-17 ENCOUNTER — HOSPITAL ENCOUNTER (OUTPATIENT)
Dept: GENERAL RADIOLOGY | Facility: HOSPITAL | Age: 57
Discharge: HOME | End: 2022-05-17

## 2022-05-17 ENCOUNTER — PRE-ADMISSION TESTING (OUTPATIENT)
Dept: PREADMISSION TESTING | Facility: HOSPITAL | Age: 57
End: 2022-05-17

## 2022-05-17 VITALS — BODY MASS INDEX: 39.99 KG/M2 | WEIGHT: 263.89 LBS | HEIGHT: 68 IN

## 2022-05-17 LAB
ANION GAP SERPL CALCULATED.3IONS-SCNC: 8 MMOL/L (ref 5–15)
BUN SERPL-MCNC: 11 MG/DL (ref 6–20)
BUN/CREAT SERPL: 12.9 (ref 7–25)
CALCIUM SPEC-SCNC: 10 MG/DL (ref 8.6–10.5)
CHLORIDE SERPL-SCNC: 102 MMOL/L (ref 98–107)
CO2 SERPL-SCNC: 28 MMOL/L (ref 22–29)
CREAT SERPL-MCNC: 0.85 MG/DL (ref 0.76–1.27)
DEPRECATED RDW RBC AUTO: 44.9 FL (ref 37–54)
EGFRCR SERPLBLD CKD-EPI 2021: 101.4 ML/MIN/1.73
ERYTHROCYTE [DISTWIDTH] IN BLOOD BY AUTOMATED COUNT: 11.9 % (ref 12.3–15.4)
GLUCOSE SERPL-MCNC: 105 MG/DL (ref 65–99)
HBA1C MFR BLD: 5.2 % (ref 4.8–5.6)
HCT VFR BLD AUTO: 44.4 % (ref 37.5–51)
HGB BLD-MCNC: 15 G/DL (ref 13–17.7)
MCH RBC QN AUTO: 34.4 PG (ref 26.6–33)
MCHC RBC AUTO-ENTMCNC: 33.8 G/DL (ref 31.5–35.7)
MCV RBC AUTO: 101.8 FL (ref 79–97)
PLATELET # BLD AUTO: 246 10*3/MM3 (ref 140–450)
PMV BLD AUTO: 9.4 FL (ref 6–12)
POTASSIUM SERPL-SCNC: 5.1 MMOL/L (ref 3.5–5.2)
QT INTERVAL: 402 MS
QTC INTERVAL: 469 MS
RBC # BLD AUTO: 4.36 10*6/MM3 (ref 4.14–5.8)
SODIUM SERPL-SCNC: 138 MMOL/L (ref 136–145)
WBC NRBC COR # BLD: 5.3 10*3/MM3 (ref 3.4–10.8)

## 2022-05-17 PROCEDURE — 36415 COLL VENOUS BLD VENIPUNCTURE: CPT

## 2022-05-17 PROCEDURE — 80048 BASIC METABOLIC PNL TOTAL CA: CPT

## 2022-05-17 PROCEDURE — 93010 ELECTROCARDIOGRAM REPORT: CPT | Performed by: INTERNAL MEDICINE

## 2022-05-17 PROCEDURE — 93005 ELECTROCARDIOGRAM TRACING: CPT

## 2022-05-17 PROCEDURE — 71046 X-RAY EXAM CHEST 2 VIEWS: CPT

## 2022-05-17 PROCEDURE — 85027 COMPLETE CBC AUTOMATED: CPT

## 2022-05-17 PROCEDURE — 83036 HEMOGLOBIN GLYCOSYLATED A1C: CPT

## 2022-05-17 NOTE — PAT
Cm met with pt who signed IMM, family will be here within 30 minutes to transport  [facility]  Patient instructed to drink 20 ounces (or until full) of Gatorade and it needs to be completed 1 hour (for Main OR patients) or 2 hours (scheduled  section patients) before given arrival time for procedure (NO RED Gatorade)    Patient verbalized understanding.  Patient's surgeon called in a prescription for Benzol Peroxide 5% wash to [facility].  Patient instructed to  from [facility] pharmacy that was submitted electronically.  Verbal and written instructions given regarding proper use of the Benzoyl Peroxide wash given to patient and/or famlily during PAT visit. Patient/family also instructed to complete checklist and return it to Pre-op on the day of surgery.  Patient and/or family verbalized understanding.      Additionally, reinforced with patient to acquire this prescription from the [facility] retail pharmacy before leaving the hospital after PAT visit due to the potential unavailability at local pharmacies.  Per Anesthesia Request, patient instructed not to take their ACE/ARB medications on the AM of surgery.  Patient directed to Radiology Department for CXR after Pre Admission Testing Appointment.

## 2022-05-22 ENCOUNTER — APPOINTMENT (OUTPATIENT)
Dept: PREADMISSION TESTING | Facility: HOSPITAL | Age: 57
End: 2022-05-22

## 2022-05-22 LAB — SARS-COV-2 RNA PNL SPEC NAA+PROBE: NOT DETECTED

## 2022-05-22 PROCEDURE — C9803 HOPD COVID-19 SPEC COLLECT: HCPCS

## 2022-05-22 PROCEDURE — U0004 COV-19 TEST NON-CDC HGH THRU: HCPCS

## 2022-05-23 ENCOUNTER — ANESTHESIA EVENT (OUTPATIENT)
Dept: PERIOP | Facility: HOSPITAL | Age: 57
End: 2022-05-23

## 2022-05-23 RX ORDER — FAMOTIDINE 10 MG/ML
20 INJECTION, SOLUTION INTRAVENOUS ONCE
Status: CANCELLED | OUTPATIENT
Start: 2022-05-23 | End: 2022-05-23

## 2022-05-24 ENCOUNTER — ANESTHESIA (OUTPATIENT)
Dept: PERIOP | Facility: HOSPITAL | Age: 57
End: 2022-05-24

## 2022-05-24 ENCOUNTER — HOSPITAL ENCOUNTER (OUTPATIENT)
Facility: HOSPITAL | Age: 57
Discharge: HOME | End: 2022-05-24
Attending: ORTHOPAEDIC SURGERY | Admitting: ORTHOPAEDIC SURGERY

## 2022-05-24 ENCOUNTER — ANESTHESIA EVENT CONVERTED (OUTPATIENT)
Dept: ANESTHESIOLOGY | Facility: HOSPITAL | Age: 57
End: 2022-05-24

## 2022-05-24 ENCOUNTER — APPOINTMENT (OUTPATIENT)
Dept: GENERAL RADIOLOGY | Facility: HOSPITAL | Age: 57
End: 2022-05-24

## 2022-05-24 VITALS
OXYGEN SATURATION: 92 % | HEART RATE: 102 BPM | RESPIRATION RATE: 16 BRPM | TEMPERATURE: 98.6 F | DIASTOLIC BLOOD PRESSURE: 97 MMHG | SYSTOLIC BLOOD PRESSURE: 153 MMHG

## 2022-05-24 DIAGNOSIS — Z96.611 S/P REVERSE TOTAL SHOULDER ARTHROPLASTY, RIGHT: Primary | ICD-10-CM

## 2022-05-24 PROCEDURE — 97165 OT EVAL LOW COMPLEX 30 MIN: CPT | Performed by: OCCUPATIONAL THERAPIST

## 2022-05-24 PROCEDURE — C1776 JOINT DEVICE (IMPLANTABLE): HCPCS | Performed by: ORTHOPAEDIC SURGERY

## 2022-05-24 PROCEDURE — 25010000002 PROPOFOL 10 MG/ML EMULSION: Performed by: NURSE ANESTHETIST, CERTIFIED REGISTERED

## 2022-05-24 PROCEDURE — 73030 X-RAY EXAM OF SHOULDER: CPT

## 2022-05-24 PROCEDURE — 76942 ECHO GUIDE FOR BIOPSY: CPT | Performed by: ORTHOPAEDIC SURGERY

## 2022-05-24 PROCEDURE — 25010000002 DEXAMETHASONE PER 1 MG: Performed by: NURSE ANESTHETIST, CERTIFIED REGISTERED

## 2022-05-24 PROCEDURE — 0 LIDOCAINE 1 % SOLUTION: Performed by: NURSE ANESTHETIST, CERTIFIED REGISTERED

## 2022-05-24 PROCEDURE — L3670 SO ACRO/CLAV CAN WEB PRE OTS: HCPCS | Performed by: ORTHOPAEDIC SURGERY

## 2022-05-24 PROCEDURE — 25010000002 PHENYLEPHRINE 10 MG/ML SOLUTION 1 ML VIAL: Performed by: NURSE ANESTHETIST, CERTIFIED REGISTERED

## 2022-05-24 PROCEDURE — 25010000002 FENTANYL CITRATE (PF) 50 MCG/ML SOLUTION: Performed by: NURSE ANESTHETIST, CERTIFIED REGISTERED

## 2022-05-24 PROCEDURE — 25010000002 NEOSTIGMINE 10 MG/10ML SOLUTION: Performed by: NURSE ANESTHETIST, CERTIFIED REGISTERED

## 2022-05-24 PROCEDURE — 25010000002 ROPIVACAINE PER 1 MG: Performed by: NURSE ANESTHETIST, CERTIFIED REGISTERED

## 2022-05-24 PROCEDURE — 97110 THERAPEUTIC EXERCISES: CPT | Performed by: OCCUPATIONAL THERAPIST

## 2022-05-24 PROCEDURE — 25010000002 CEFTRIAXONE PER 250 MG: Performed by: ORTHOPAEDIC SURGERY

## 2022-05-24 PROCEDURE — 97530 THERAPEUTIC ACTIVITIES: CPT | Performed by: OCCUPATIONAL THERAPIST

## 2022-05-24 PROCEDURE — 97535 SELF CARE MNGMENT TRAINING: CPT | Performed by: OCCUPATIONAL THERAPIST

## 2022-05-24 PROCEDURE — 25010000002 ONDANSETRON PER 1 MG: Performed by: NURSE ANESTHETIST, CERTIFIED REGISTERED

## 2022-05-24 DEVICE — ALTIVATE REVERSE, HUMERAL STEM, STANDARD SHELL, SZ 8X48MM
Type: IMPLANTABLE DEVICE | Site: SHOULDER | Status: FUNCTIONAL
Brand: DJO SURGICAL

## 2022-05-24 DEVICE — RSP BASEPLATE, 30MM, W/P2 COATING
Type: IMPLANTABLE DEVICE | Site: SHOULDER | Status: FUNCTIONAL
Brand: DJO SURGICAL

## 2022-05-24 DEVICE — SCREW, LOCKING BONE, RSP, 5X22
Type: IMPLANTABLE DEVICE | Site: SHOULDER | Status: FUNCTIONAL
Brand: DJO SURGICAL

## 2022-05-24 DEVICE — SCREW, LOCKING BONE, RSP, 5X30
Type: IMPLANTABLE DEVICE | Site: SHOULDER | Status: FUNCTIONAL
Brand: DJO SURGICAL

## 2022-05-24 DEVICE — RSP SEMICONSTRAINED HUMERAL SOCKET INSERT, 36MM, HXE-PLUS
Type: IMPLANTABLE DEVICE | Site: SHOULDER | Status: FUNCTIONAL
Brand: DJO SURGICAL

## 2022-05-24 DEVICE — SCREW, LOCKING BONE, RSP, 5X14
Type: IMPLANTABLE DEVICE | Site: SHOULDER | Status: FUNCTIONAL
Brand: DJO SURGICAL

## 2022-05-24 DEVICE — TOTL REV SHLDR DJO: Type: IMPLANTABLE DEVICE | Site: SHOULDER | Status: FUNCTIONAL

## 2022-05-24 DEVICE — GLENOID, HEAD W/RETAINING SCREW, RSP, 36MM, NEUTRAL
Type: IMPLANTABLE DEVICE | Site: SHOULDER | Status: FUNCTIONAL
Brand: DJO SURGICAL

## 2022-05-24 RX ORDER — PROPOFOL 10 MG/ML
VIAL (ML) INTRAVENOUS AS NEEDED
Status: DISCONTINUED | OUTPATIENT
Start: 2022-05-24 | End: 2022-05-24 | Stop reason: SURG

## 2022-05-24 RX ORDER — PROMETHAZINE HYDROCHLORIDE 25 MG/1
25 TABLET ORAL ONCE AS NEEDED
Status: DISCONTINUED | OUTPATIENT
Start: 2022-05-24 | End: 2022-05-24 | Stop reason: HOSPADM

## 2022-05-24 RX ORDER — HYDROMORPHONE HYDROCHLORIDE 1 MG/ML
0.5 INJECTION, SOLUTION INTRAMUSCULAR; INTRAVENOUS; SUBCUTANEOUS
Status: DISCONTINUED | OUTPATIENT
Start: 2022-05-24 | End: 2022-05-24 | Stop reason: HOSPADM

## 2022-05-24 RX ORDER — ONDANSETRON 4 MG/1
4 TABLET, FILM COATED ORAL EVERY 8 HOURS PRN
Qty: 12 TABLET | Refills: 0 | Status: SHIPPED | OUTPATIENT
Start: 2022-05-24

## 2022-05-24 RX ORDER — TRANEXAMIC ACID 10 MG/ML
1000 INJECTION, SOLUTION INTRAVENOUS ONCE
Status: COMPLETED | OUTPATIENT
Start: 2022-05-24 | End: 2022-05-24

## 2022-05-24 RX ORDER — SODIUM CHLORIDE 0.9 % (FLUSH) 0.9 %
10 SYRINGE (ML) INJECTION AS NEEDED
Status: DISCONTINUED | OUTPATIENT
Start: 2022-05-24 | End: 2022-05-24 | Stop reason: HOSPADM

## 2022-05-24 RX ORDER — HYDROCODONE BITARTRATE AND ACETAMINOPHEN 7.5; 325 MG/1; MG/1
1-2 TABLET ORAL EVERY 4 HOURS PRN
Qty: 24 TABLET | Refills: 0 | Status: SHIPPED | OUTPATIENT
Start: 2022-05-24

## 2022-05-24 RX ORDER — ONDANSETRON 2 MG/ML
INJECTION INTRAMUSCULAR; INTRAVENOUS AS NEEDED
Status: DISCONTINUED | OUTPATIENT
Start: 2022-05-24 | End: 2022-05-24 | Stop reason: SURG

## 2022-05-24 RX ORDER — DOCUSATE SODIUM 100 MG/1
100 CAPSULE, LIQUID FILLED ORAL 2 TIMES DAILY
Qty: 20 CAPSULE | Refills: 0 | Status: SHIPPED | OUTPATIENT
Start: 2022-05-24

## 2022-05-24 RX ORDER — SODIUM CHLORIDE 0.9 % (FLUSH) 0.9 %
10 SYRINGE (ML) INJECTION EVERY 12 HOURS SCHEDULED
Status: DISCONTINUED | OUTPATIENT
Start: 2022-05-24 | End: 2022-05-24 | Stop reason: HOSPADM

## 2022-05-24 RX ORDER — BUPIVACAINE HCL/0.9 % NACL/PF 0.125 %
PLASTIC BAG, INJECTION (ML) EPIDURAL AS NEEDED
Status: DISCONTINUED | OUTPATIENT
Start: 2022-05-24 | End: 2022-05-24 | Stop reason: SURG

## 2022-05-24 RX ORDER — MAGNESIUM HYDROXIDE 1200 MG/15ML
LIQUID ORAL AS NEEDED
Status: DISCONTINUED | OUTPATIENT
Start: 2022-05-24 | End: 2022-05-24 | Stop reason: HOSPADM

## 2022-05-24 RX ORDER — LIDOCAINE HYDROCHLORIDE 10 MG/ML
INJECTION, SOLUTION INFILTRATION; PERINEURAL AS NEEDED
Status: DISCONTINUED | OUTPATIENT
Start: 2022-05-24 | End: 2022-05-24 | Stop reason: SURG

## 2022-05-24 RX ORDER — FENTANYL CITRATE 50 UG/ML
50 INJECTION, SOLUTION INTRAMUSCULAR; INTRAVENOUS
Status: DISCONTINUED | OUTPATIENT
Start: 2022-05-24 | End: 2022-05-24 | Stop reason: HOSPADM

## 2022-05-24 RX ORDER — CEFAZOLIN SODIUM IN 0.9 % NACL 3 G/100 ML
3 INTRAVENOUS SOLUTION, PIGGYBACK (ML) INTRAVENOUS ONCE
Status: COMPLETED | OUTPATIENT
Start: 2022-05-24 | End: 2022-05-24

## 2022-05-24 RX ORDER — GLYCOPYRROLATE 0.2 MG/ML
INJECTION INTRAMUSCULAR; INTRAVENOUS AS NEEDED
Status: DISCONTINUED | OUTPATIENT
Start: 2022-05-24 | End: 2022-05-24 | Stop reason: SURG

## 2022-05-24 RX ORDER — NEOSTIGMINE METHYLSULFATE 1 MG/ML
INJECTION, SOLUTION INTRAVENOUS AS NEEDED
Status: DISCONTINUED | OUTPATIENT
Start: 2022-05-24 | End: 2022-05-24 | Stop reason: SURG

## 2022-05-24 RX ORDER — MIDAZOLAM HYDROCHLORIDE 1 MG/ML
1 INJECTION INTRAMUSCULAR; INTRAVENOUS
Status: DISCONTINUED | OUTPATIENT
Start: 2022-05-24 | End: 2022-05-24 | Stop reason: HOSPADM

## 2022-05-24 RX ORDER — ACETAMINOPHEN 500 MG
1000 TABLET ORAL ONCE
Status: COMPLETED | OUTPATIENT
Start: 2022-05-24 | End: 2022-05-24

## 2022-05-24 RX ORDER — PREGABALIN 75 MG/1
75 CAPSULE ORAL ONCE
Status: COMPLETED | OUTPATIENT
Start: 2022-05-24 | End: 2022-05-24

## 2022-05-24 RX ORDER — ROCURONIUM BROMIDE 10 MG/ML
INJECTION, SOLUTION INTRAVENOUS AS NEEDED
Status: DISCONTINUED | OUTPATIENT
Start: 2022-05-24 | End: 2022-05-24 | Stop reason: SURG

## 2022-05-24 RX ORDER — FAMOTIDINE 20 MG/1
20 TABLET, FILM COATED ORAL ONCE
Status: COMPLETED | OUTPATIENT
Start: 2022-05-24 | End: 2022-05-24

## 2022-05-24 RX ORDER — PROMETHAZINE HYDROCHLORIDE 25 MG/1
25 SUPPOSITORY RECTAL ONCE AS NEEDED
Status: DISCONTINUED | OUTPATIENT
Start: 2022-05-24 | End: 2022-05-24 | Stop reason: HOSPADM

## 2022-05-24 RX ORDER — FENTANYL CITRATE 50 UG/ML
INJECTION, SOLUTION INTRAMUSCULAR; INTRAVENOUS AS NEEDED
Status: DISCONTINUED | OUTPATIENT
Start: 2022-05-24 | End: 2022-05-24 | Stop reason: SURG

## 2022-05-24 RX ORDER — LIDOCAINE HYDROCHLORIDE 10 MG/ML
0.5 INJECTION, SOLUTION EPIDURAL; INFILTRATION; INTRACAUDAL; PERINEURAL ONCE AS NEEDED
Status: COMPLETED | OUTPATIENT
Start: 2022-05-24 | End: 2022-05-24

## 2022-05-24 RX ORDER — SODIUM CHLORIDE, SODIUM LACTATE, POTASSIUM CHLORIDE, CALCIUM CHLORIDE 600; 310; 30; 20 MG/100ML; MG/100ML; MG/100ML; MG/100ML
9 INJECTION, SOLUTION INTRAVENOUS CONTINUOUS
Status: DISCONTINUED | OUTPATIENT
Start: 2022-05-24 | End: 2022-05-24 | Stop reason: HOSPADM

## 2022-05-24 RX ORDER — BUPIVACAINE HYDROCHLORIDE 2.5 MG/ML
INJECTION, SOLUTION EPIDURAL; INFILTRATION; INTRACAUDAL
Status: COMPLETED | OUTPATIENT
Start: 2022-05-24 | End: 2022-05-24

## 2022-05-24 RX ORDER — DEXAMETHASONE SODIUM PHOSPHATE 4 MG/ML
INJECTION, SOLUTION INTRA-ARTICULAR; INTRALESIONAL; INTRAMUSCULAR; INTRAVENOUS; SOFT TISSUE AS NEEDED
Status: DISCONTINUED | OUTPATIENT
Start: 2022-05-24 | End: 2022-05-24 | Stop reason: SURG

## 2022-05-24 RX ADMIN — Medication 100 MCG: at 07:58

## 2022-05-24 RX ADMIN — TRANEXAMIC ACID 1000 MG: 10 INJECTION, SOLUTION INTRAVENOUS at 07:50

## 2022-05-24 RX ADMIN — DEXAMETHASONE SODIUM PHOSPHATE 8 MG: 4 INJECTION, SOLUTION INTRA-ARTICULAR; INTRALESIONAL; INTRAMUSCULAR; INTRAVENOUS; SOFT TISSUE at 07:50

## 2022-05-24 RX ADMIN — Medication 100 MCG: at 07:55

## 2022-05-24 RX ADMIN — ONDANSETRON 4 MG: 2 INJECTION INTRAMUSCULAR; INTRAVENOUS at 08:43

## 2022-05-24 RX ADMIN — NEOSTIGMINE METHYLSULFATE 3 MG: 0.5 INJECTION INTRAVENOUS at 08:57

## 2022-05-24 RX ADMIN — PHENYLEPHRINE HYDROCHLORIDE 0.25 MCG/KG/MIN: 10 INJECTION INTRAVENOUS at 08:02

## 2022-05-24 RX ADMIN — GLYCOPYRROLATE 0.4 MG: 0.2 INJECTION INTRAMUSCULAR; INTRAVENOUS at 08:57

## 2022-05-24 RX ADMIN — LIDOCAINE HYDROCHLORIDE 0.2 ML: 10 INJECTION, SOLUTION EPIDURAL; INFILTRATION; INTRACAUDAL; PERINEURAL at 06:15

## 2022-05-24 RX ADMIN — PREGABALIN 75 MG: 75 CAPSULE ORAL at 06:35

## 2022-05-24 RX ADMIN — LIDOCAINE HYDROCHLORIDE 50 MG: 10 INJECTION, SOLUTION INFILTRATION; PERINEURAL at 07:33

## 2022-05-24 RX ADMIN — TRANEXAMIC ACID 1000 MG: 10 INJECTION, SOLUTION INTRAVENOUS at 08:43

## 2022-05-24 RX ADMIN — SODIUM CHLORIDE, POTASSIUM CHLORIDE, SODIUM LACTATE AND CALCIUM CHLORIDE 9 ML/HR: 600; 310; 30; 20 INJECTION, SOLUTION INTRAVENOUS at 06:15

## 2022-05-24 RX ADMIN — Medication 200 MCG: at 08:32

## 2022-05-24 RX ADMIN — FAMOTIDINE 20 MG: 20 TABLET ORAL at 06:35

## 2022-05-24 RX ADMIN — PROPOFOL 25 MCG/KG/MIN: 10 INJECTION, EMULSION INTRAVENOUS at 07:41

## 2022-05-24 RX ADMIN — Medication 3 G: at 07:31

## 2022-05-24 RX ADMIN — PROPOFOL 200 MG: 10 INJECTION, EMULSION INTRAVENOUS at 07:33

## 2022-05-24 RX ADMIN — SODIUM CHLORIDE, POTASSIUM CHLORIDE, SODIUM LACTATE AND CALCIUM CHLORIDE: 600; 310; 30; 20 INJECTION, SOLUTION INTRAVENOUS at 08:43

## 2022-05-24 RX ADMIN — ROPIVACAINE HYDROCHLORIDE 6 ML/HR: 5 INJECTION, SOLUTION EPIDURAL; INFILTRATION; PERINEURAL at 09:03

## 2022-05-24 RX ADMIN — Medication 200 MCG: at 08:02

## 2022-05-24 RX ADMIN — FENTANYL CITRATE 100 MCG: 50 INJECTION, SOLUTION INTRAMUSCULAR; INTRAVENOUS at 07:05

## 2022-05-24 RX ADMIN — BUPIVACAINE HYDROCHLORIDE 15 ML: 2.5 INJECTION, SOLUTION EPIDURAL; INFILTRATION; INTRACAUDAL at 07:05

## 2022-05-24 RX ADMIN — ACETAMINOPHEN 1000 MG: 500 TABLET ORAL at 06:34

## 2022-05-24 RX ADMIN — ROCURONIUM BROMIDE 50 MG: 10 INJECTION, SOLUTION INTRAVENOUS at 07:33

## 2022-05-24 NOTE — ANESTHESIA PROCEDURE NOTES
Airway  Urgency: elective    Date/Time: 5/24/2022 7:40 AM  Airway not difficult    General Information and Staff    Patient location during procedure: OR  SRNA: Danyelle Michelle SRNA  Indications and Patient Condition  Indications for airway management: airway protection    Preoxygenated: yes  MILS not maintained throughout  Mask difficulty assessment: 2 - vent by mask + OA or adjuvant +/- NMBA    Final Airway Details  Final airway type: endotracheal airway      Successful airway: ETT  Cuffed: yes   Successful intubation technique: direct laryngoscopy  Endotracheal tube insertion site: oral  Blade: Marie  Blade size: 2  ETT size (mm): 7.5  Cormack-Lehane Classification: grade I - full view of glottis  Placement verified by: chest auscultation and capnometry   Cuff volume (mL): 5  Measured from: lips  ETT/EBT  to lips (cm): 22  Number of attempts at approach: 1  Assessment: lips, teeth, and gum same as pre-op and atraumatic intubation    Additional Comments  Negative epigastric sounds, Breath sound equal bilaterally with symmetric chest rise and fall

## 2022-05-24 NOTE — ANESTHESIA PROCEDURE NOTES
PECS      Patient reassessed immediately prior to procedure    Patient location during procedure: OR  Reason for block: at surgeon's request and post-op pain management  Preanesthetic Checklist  Completed: patient identified, IV checked, site marked, risks and benefits discussed, surgical consent, monitors and equipment checked, pre-op evaluation and timeout performed  Prep:  Pt Position: supine  Sterile barriers:cap, gloves, gown and mask  Prep: ChloraPrep  Patient monitoring: blood pressure monitoring, continuous pulse oximetry and EKG  Procedure  Performed under: general  Guidance:ultrasound guided and landmark technique  Images:still images obtained, printed/placed on chart    Laterality:Bilateral  Block Type:PECS I and PECS II  Injection Technique:single-shot  Needle Type:short-bevel  Needle Gauge:20 G  Resistance on Injection: none          Medications  Preservative Free Saline:10ml  Comment:Block Injection:  Total volume of LA divided between Right and Left sided blocks         Post Assessment  Injection Assessment: negative aspiration for heme and incremental injection  Patient Tolerance:comfortable throughout block  Complications:no  Additional Notes  The pt. Was placed in the Supine Position and GA was induced     The insertion site was prepped with CHG and Ultrasound guidance with In-Plane techniquewas  a 4inch BBraun 360 degree echogenic needle was visualized.  Normal Saline PSF was  utilized for hydrodissection of tissue. PECS 1 Block- Pectoralis Major and Minor where identified and LA was injected between PMM and PmM at the level of the 3rd Rib(10ml),  PECS 2-  Pectoralis Minor and Serratus muscle where identified and the needle was advanced laterally in-plane with the 4th rib as a backstop, pleura was monitored.  LA was injected between SA and PmM at the level of 4th rib( 20ml).  LA injection spread was visualized, injection was incremental 1-5ml, normal or low injection pressure, no intravascular  injection, no pneumothorax appreciated.  Thank You.

## 2022-05-24 NOTE — PLAN OF CARE
Goal Outcome Evaluation:  Plan of Care Reviewed With: patient        Progress: improving  Outcome Evaluation: VSS. No complaints of pain. Pt ambulated 450ft. Pt at 90% RA when returning from walk. IS 1250. Encouraging use.

## 2022-05-24 NOTE — OP NOTE
DATE OF OPERATION: 05/24/22  PREOPERATIVE DIAGNOSIS: right shoulder rotator cuff arthropathy.    POSTOPERATIVE DIAGNOSES:  1. right shoulder rotator cuff arthropathy  2. Biceps tenosynovitis.    PROCEDURES PERFORMED:  1. right reverse total shoulder arthroplasty.    2. right biceps tenodesis.      Procedure/CPT® Codes:  59608  59184    Procedure(s):  RIGHT TOTAL REVERSE  SHOULDER ARTHROPLASTY W/ BICEPS TENDONESIS    Staff:  Surgeon(s):  Obi Hernández Jr., MD    Circulator: Zenaida Jimenes RN  Scrub Person: Tony Spear  Nursing Assistant: Geraldine Marie  Assistant: Katelyn Davis PA-C     Assistant: Katelyn Davis PA-C  was responsible for performing the following activities: Retraction, Suction, Irrigation, Suturing, Closing and Placing Dressing and their skilled assistance was necessary for the success of this case.    Anesthesia: General with Block    Estimated Blood Loss: 100ml    Implants:    Implant Name Type Inv. Item Serial No.  Lot No. LRB No. Used Action   SCRW BONE RSP LK 5X22MM - [id number] Implant SCRW BONE RSP LK 5X22MM  [employer] [id number] Right 1 Implanted   SCRW BONE RSP LK 5X14MM - [id number] Implant SCRW BONE RSP LK 5X14MM  [employer] [id number] Right 2 Implanted   HD TASHA SHLDR REV NTRL 36MM - [id number] Implant HD TASHA SHLDR REV NTRL 36MM  [employer] [id number] Right 1 Implanted   BASEPLT TASHA REV RSP TI [id number] - [id number] Implant BASEPLT TASHA REV RSP TI [id number]  [employer] [id number] Right 1 Implanted   SCRW BONE RSP LK 5X30MM - [id number] Implant SCRW BONE RSP LK 5X30MM  [employer] [id number] Right 1 Implanted   STEM HUM/SHLDR ALTIVATE REV STD SZ8 48MM - [id number] Implant STEM HUM/SHLDR ALTIVATE REV STD SZ8 48MM  [employer] [id number] Right 1 Implanted   INSRT HUM RSP SOCKT SHLDR HXE/PLS 36MM - [id number] Implant INSRT HUM RSP SOCKT SHLDR HXE/PLS 36MM  [employer] [id number] Right 1 Implanted       Specimen:                 None    INDICATIONS: This is a 56-year-old male with right shoulder pain and limited function and motion secondary to rotator cuff arthropathy. They have failed conservative treatment and after a discussion of risks, benefits, and alternatives, wished to proceed with shoulder arthroplasty.  DESCRIPTION OF PROCEDURE: On the day of surgery, the patient identified the right shoulder as the correct operative extremity. This was initialed by the surgeon with the patient's acknowledgment. The patient underwent placement of an interscalene block and was taken to the operating room and placed in the supine position. Upon induction of adequate anesthesia, the patient was brought up to the beach chair position and the shoulder and upper extremity were prepped and draped in the usual sterile fashion. Timeout confirmed the correct patient and operative extremity as well as that antibiotics were on board. A standard deltopectoral approach to the shoulder was carried out. It was carried sharply through the skin and subcutaneous tissue. Medial and lateral flaps were developed over the deltopectoral fascia. The cephalic vein was identified and mobilized laterally with the deltoid. The subdeltoid and subpectoral spaces were mobilized and a blunt retractor was placed deep to this. The clavipectoral fascia was opened on the lateral edge of the conjoined tendon and the retractor was moved deep to this. The leading edge of the pectoralis was released exposing the long head of the biceps. This was tenosynovitic. It was tenodesed to the pectoralis and released proximal to this. The 3 sisters were identified and coagulated. There was only the inferior 1/3 of the subscapularis that remained attached. A subscapularis peel was performed, removing the subscapularis from the lesser tuberosity and rotator interval was released to the glenoid exposing the humeral head. The inferior capsule was released directly off the humerus to allow greater  than 90° of external rotation. The anatomic neck was exposed and the humeral head osteotomy was performed in approximately 30° of retroversion. The remainder of the osteophytes were removed. The humerus was subluxated posteriorly. The glenoid exposed. Circumferential labral excision and capsular release were performed. A 270° mobilization of the subscapularis was carried out as well.  A centering hole was drilled and a 6.5 mm tap was placed. The glenoid was gently reamed and then the tap was removed. A standard baseplate was then screwed into place, achieving excellent bite.  We then placed the drill guide for the locking screws, then drilled and placed 4 peripheral locking screws.  The glenosphere was then inserted and locked into place with a set screw.  The humerus was carefully subluxed back anteriorly. The canal was then entered, reamed, and broached. The final stem impacted in in approximately 30° of retroversion. A trial polyethylene was placed and trialing was carried out. The appropriate final size polyethylene component chosen and malleted into place.  The shoulder was then reduced. This allowed nearly full passive range of motion with no instability. The joint was copiously irrigated with normal saline irrigation mixed with Rocephin after the final implants were assembled and locked into place. We were unable to repair the subscapularis due to insufficiency of the tendon.  Passive range of motion will be 90 degrees but external rotation will be limited to 0° in the perioperative period. The deltopectoral interval was approximated with 0 Vicryl, the subcutaneous tissue with 2-0 Vicryl, and the skin with exofin dressing. Anesthesia was reversed and the patient was taken to the recovery room in stable condition. All instrument, needle, and sponge counts were correct.      Obi Hernández Jr., MD  05/24/22  08:57 EDT

## 2022-05-24 NOTE — PLAN OF CARE
Goal Outcome Evaluation:  Plan of Care Reviewed With: patient, spouse           Outcome Evaluation: Interscalene infusing at 4, no c/o pain. OT educated pt and spouse on R shoulder precautions, ADL retraining to maintain, sling management and RUE HEP. Pt tolerated R shoudler AAFE 120 with good teachback from spouse. Pt passed mobility screen, no PT needs. Recommend DC home with initial 24/7 assist.

## 2022-05-24 NOTE — ANESTHESIA POSTPROCEDURE EVALUATION
Patient: Michel Gregory Ensor II    Procedure Summary     Date: 05/24/22 Room / Location:  [facility] [address] [place] [address] [facility]    Anesthesia Start: 0728 Anesthesia Stop: 0910    Procedure: RIGHT TOTAL REVERSE  SHOULDER ARTHROPLASTY W/ BICEPS TENDONESIS (Right Shoulder) Diagnosis:     Surgeons: Obi Hernández Jr., MD Provider: Ganesh Elias MD    Anesthesia Type: general ASA Status: 3          Anesthesia Type: general    Vitals  Vitals Value Taken Time   /88 05/24/22 0910   Temp 97.4 °F (36.3 °C) 05/24/22 0910   Pulse 85 05/24/22 0911   Resp 16 05/24/22 0910   SpO2 93 % 05/24/22 0911   Vitals shown include unvalidated device data.        Post Anesthesia Care and Evaluation    Patient location during evaluation: PACU  Patient participation: complete - patient participated  Level of consciousness: awake and alert  Pain management: adequate  Airway patency: patent  Anesthetic complications: No anesthetic complications  PONV Status: none  Cardiovascular status: hemodynamically stable and acceptable  Respiratory status: nonlabored ventilation, acceptable and nasal cannula  Hydration status: acceptable

## 2022-05-24 NOTE — THERAPY DISCHARGE NOTE
Acute Care - Occupational Therapy Discharge  [facility]    Patient Name: Michel Lopez II  : 1965    MRN: [id number]                              Today's Date: 2022       Admit Date: 2022    Visit Dx:     ICD-10-CM ICD-9-CM   1. S/P reverse total shoulder arthroplasty, right  Z96.611 V43.61     There is no problem list on file for this patient.    Past Medical History:   Diagnosis Date   • Arthritis     shoulders   • Hypertension    • Sleep apnea     Does not use CPAP machine.    • Wears glasses    • Wears partial dentures     both upper and lower partials     Past Surgical History:   Procedure Laterality Date   • AMPUTATION FINGER / THUMB      reattached  4 fingers including thumb in 1993 in work accident   • COLONOSCOPY      last colonoscopy was with in last 5 years   • KNEE ARTHROPLASTY, PARTIAL REPLACEMENT      left   • SHOULDER ARTHROSCOPY      right   • SHOULDER ARTHROSCOPY W/ CAPSULAR REPAIR Left 10/12/2021    Procedure: ARTHROSCOPY LEFT SHOULDER WITH ROTATOR CUFF REPAIR;  Surgeon: Obi Hernández Jr., MD;  Location: [facility];  Service: Orthopedics;  Laterality: Left;      General Information     Row Name 22 172          OT Time and Intention    Document Type evaluation;therapy note (daily note);discharge treatment  -AR     Mode of Treatment individual therapy;occupational therapy  -AR     Row Name 22 172          General Information    Patient Profile Reviewed yes  -AR     Prior Level of Function independent:;all household mobility;community mobility;gait;transfer;min assist:;ADL's  -AR     Existing Precautions/Restrictions fall;right;shoulder;non-weight bearing;other (see comments)  interscalene nerve catheter, Donjoy Ultra II sling without pillow  -AR     Barriers to Rehab none identified  -AR     Row Name 22          Living Environment    People in Home spouse  -AR     Row Name 22          Home Main Entrance    Number of Stairs, Main  Entrance none  -AR     Row Name 05/24/22 1727          Stairs Within Home, Primary    Number of Stairs, Within Home, Primary none  -AR     Stairs Comment, Within Home, Primary Pt will initially stay on main level of home  -AR     Row Name 05/24/22 1727          Cognition    Orientation Status (Cognition) oriented x 4  -AR     Row Name 05/24/22 1727          Safety Issues, Functional Mobility    Safety Issues Affecting Function (Mobility) safety precaution awareness;safety precautions follow-through/compliance  -AR     Impairments Affecting Function (Mobility) range of motion (ROM);sensation/sensory awareness;strength  -AR           User Key  (r) = Recorded By, (t) = Taken By, (c) = Cosigned By    Initials Name Provider Type    AR Mariana Arshad OT Occupational Therapist               Mobility/ADL's     Row Name 05/24/22 1729          Bed Mobility    Bed Mobility supine-sit;scooting/bridging  -AR     Scooting/Bridging [place] (Bed Mobility) supervision  -AR     Supine-Sit [place] (Bed Mobility) supervision  -AR     Assistive Device (Bed Mobility) head of bed elevated  -AR     Comment, (Bed Mobility) Educated pt and spouse on importance of maintaining NWB RUE AAT, reviewed safe sleeping position  -AR     Row Name 05/24/22 1729          Transfers    Transfers sit-stand transfer;stand-sit transfer  -AR     Sit-Stand [place] (Transfers) supervision;verbal cues  -AR     Stand-Sit [place] (Transfers) supervision;verbal cues  -AR     Row Name 05/24/22 1729          Functional Mobility    Functional Mobility- Ind. Level supervision required  -AR     Functional Mobility-Distance (Feet) 300  -AR     Functional Mobility- Comment Pt ambulated 300 feet on RA with supervision, no episodes of desaturation. Pt passed mobility screen with score of 22, no PT needs.  -AR     Row Name 05/24/22 1729          Activities of Daily Living    BADL Assessment/Intervention bathing;upper body dressing;lower body  dressing;feeding  -AR     Row Name 05/24/22 1729          Mobility    Extremity Weight-bearing Status right upper extremity  -AR     Right Upper Extremity (Weight-bearing Status) non weight-bearing (NWB)  -AR     Row Name 05/24/22 1729          Bathing Assessment/Intervention    Comment, (Bathing) Educated pt and spouse on right shoulder precautions, axilla care to maintain and that pt may not shower until interscalene has been DC  -AR     Row Name 05/24/22 1729          Upper Body Dressing Assessment/Training    [place] Level (Upper Body Dressing) doff;front opening garment;minimum assist (75% patient effort);don;pull-over garment;moderate assist (50% patient effort)  -AR     Position (Upper Body Dressing) edge of bed sitting  -AR     Comment, (Upper Body Dressing) Educated pt and spouse on R shoulder precautions, ADL retraining to maintain, sling management and care of interscalene nerve catheter during ADLs to avoid dislodgement. Post education, spouse able to don/doff sling with supervision.  -AR     Row Name 05/24/22 1729          Lower Body Dressing Assessment/Training    [place] Level (Lower Body Dressing) don;pants/bottoms;minimum assist (75% patient effort)  -AR     Position (Lower Body Dressing) edge of bed sitting;unsupported standing  -AR     Row Name 05/24/22 1729          Self-Feeding Assessment/Training    [place] Level (Feeding) liquids to mouth;supervision  -AR     Position (Self-Feeding) edge of bed sitting  -AR           User Key  (r) = Recorded By, (t) = Taken By, (c) = Cosigned By    Initials Name Provider Type    Mariana Barron OT Occupational Therapist               Obj/Interventions     Row Name 05/24/22 1733          Sensory Assessment (Somatosensory)    Sensory Assessment (Somatosensory) right UE  -AR     Sensory Subjective Reports numbness  -AR     Row Name 05/24/22 1733          Vision Assessment/Intervention    Visual Impairment/Limitations WNL  -AR     Row Name  05/24/22 1733          Range of Motion Comprehensive    Comment, General Range of Motion LUE WFL, RUE elbow/wrist/hand WFL  -AR     Row Name 05/24/22 1733          Strength Comprehensive (MMT)    Comment, General Manual Muscle Testing (MMT) Assessment LUE WFL, RUE deferred  -AR     Row Name 05/24/22 1733          Shoulder (Therapeutic Exercise)    Shoulder (Therapeutic Exercise) AAROM (active assistive range of motion)  -AR     Shoulder AAROM (Therapeutic Exercise) right;flexion;extension;sitting;10 repetitions  educted pt and spouse on AAROM and requested spouse assist until interscalene DC. Reviewed that pt may use pulleys once motor control in RUE back to normal. Pt has pulleys at home for use.  -AR     Row Name 05/24/22 1733          Elbow/Forearm (Therapeutic Exercise)    Elbow/Forearm (Therapeutic Exercise) AAROM (active assistive range of motion)  -AR     Elbow/Forearm AAROM (Therapeutic Exercise) right;flexion;extension;supination;pronation;sitting;10 repetitions  -AR     Row Name 05/24/22 1733          Wrist (Therapeutic Exercise)    Wrist (Therapeutic Exercise) AROM (active range of motion)  -AR     Wrist AROM (Therapeutic Exercise) right;extension;flexion;10 repetitions  -AR     Row Name 05/24/22 1733          Hand (Therapeutic Exercise)    Hand (Therapeutic Exercise) AROM (active range of motion)  -AR     Hand AROM/AAROM (Therapeutic Exercise) right;finger extension;finger flexion;10 repetitions  -AR     Row Name 05/24/22 1733          Motor Skills    Therapeutic Exercise shoulder;elbow/forearm;wrist;hand  Issued and reviewed written RUE HEP  -AR     Row Name 05/24/22 1733          Balance    Balance Assessment sitting static balance;sitting dynamic balance;standing static balance;standing dynamic balance  -AR     Static Sitting Balance independent  -AR     Dynamic Sitting Balance independent  -AR     Position, Sitting Balance sitting edge of bed  -AR     Static Standing Balance independent  -AR      Dynamic Standing Balance independent  -AR     Position/Device Used, Standing Balance unsupported  -AR           User Key  (r) = Recorded By, (t) = Taken By, (c) = Cosigned By    Initials Name Provider Type    Mariana Barron OT Occupational Therapist               Goals/Plan     Row Name 05/24/22 1738          Transfer Goal 1 (OT)    Activity/Assistive Device (Transfer Goal 1, OT) sit-to-stand/stand-to-sit  -AR     [place] Level/Cues Needed (Transfer Goal 1, OT) supervision required;verbal cues required  -AR     Time Frame (Transfer Goal 1, OT) long term goal (LTG);by discharge  -AR     Progress/Outcome (Transfer Goal 1, OT) goal met  -AR     Row Name 05/24/22 1738          Dressing Goal 1 (OT)    Activity/Device (Dressing Goal 1, OT) other (see comments)  Pt and spouse will don RUE sling wtih supervision  -AR     Time Frame (Dressing Goal 1, OT) long term goal (LTG);by discharge  -AR     Progress/Outcome (Dressing Goal 1, OT) goal met  -AR     Row Name 05/24/22 1738          ROM Goal 1 (OT)    ROM Goal 1 (OT) Pt and spouse will complete RUE HEP within physician parameters with supervision  -AR     Time Frame (ROM Goal 1, OT) long term goal (LTG);by discharge  -AR     Progress/Outcome (ROM Goal 1, OT) goal met  -AR     Row Name 05/24/22 1738          Therapy Assessment/Plan (OT)    Planned Therapy Interventions (OT) BADL retraining;edema control/reduction;IADL retraining;occupation/activity based interventions;orthotic fabrication/fitting/training;patient/caregiver education/training;ROM/therapeutic exercise;transfer/mobility retraining  -AR           User Key  (r) = Recorded By, (t) = Taken By, (c) = Cosigned By    Initials Name Provider Type    Mariana Barron OT Occupational Therapist               Clinical Impression     Row Name 05/24/22 1736          Pain Assessment    Pretreatment Pain Rating 0/10 - no pain  -AR     Posttreatment Pain Rating 0/10 - no pain  -AR     Pain Intervention(s)  Medication (See MAR);Cold applied;Repositioned;Ambulation/increased activity  -AR     Row Name 05/24/22 1736          Plan of Care Review    Plan of Care Reviewed With patient;spouse  -AR     Outcome Evaluation Interscalene infusing at 4, no c/o pain. OT educated pt and spouse on R shoulder precautions, ADL retraining to maintain, sling management and RUE HEP. Pt tolerated R shoudler AAFE 120 with good teachback from spouse. Pt passed mobility screen, no PT needs. Recommend DC home with initial 24/7 assist.  -AR     Row Name 05/24/22 1736          Therapy Assessment/Plan (OT)    Rehab Potential (OT) good, to achieve stated therapy goals  -AR     Criteria for Skilled Therapeutic Interventions Met (OT) yes  -AR     Therapy Frequency (OT) evaluation only  -AR     Row Name 05/24/22 1736          Therapy Plan Review/Discharge Plan (OT)    Anticipated Discharge Disposition (OT) home with 24/7 care  -AR     Row Name 05/24/22 1736          Vital Signs    Pre SpO2 (%) 93  -AR     O2 Delivery Pre Treatment room air  -AR     Intra SpO2 (%) 92  -AR     O2 Delivery Intra Treatment room air  -AR     Post SpO2 (%) 93  -AR     O2 Delivery Post Treatment room air  -AR     Pre Patient Position Supine  -AR     Intra Patient Position Standing  -AR     Post Patient Position Sitting  -AR     Row Name 05/24/22 1736          Positioning and Restraints    Pre-Treatment Position in bed  -AR     Post Treatment Position bed  -AR     In Bed sitting EOB;call light within reach;encouraged to call for assist;with family/caregiver;notified nsg;with brace  cold pack applied  -AR           User Key  (r) = Recorded By, (t) = Taken By, (c) = Cosigned By    Initials Name Provider Type    Mariana Barron, OT Occupational Therapist               Outcome Measures     Row Name 05/24/22 1730          How much help from another is currently needed...    Putting on and taking off regular lower body clothing? 2  -AR     Bathing (including washing,  rinsing, and drying) 3  -AR     Toileting (which includes using toilet bed pan or urinal) 2  -AR     Putting on and taking off regular upper body clothing 2  -AR     Taking care of personal grooming (such as brushing teeth) 3  -AR     Eating meals 3  -AR     AM-PAC 6 Clicks Score (OT) 15  -AR     Row Name 05/24/22 1739          Functional Assessment    Outcome Measure Options AM-PAC 6 Clicks Daily Activity (OT)  -AR           User Key  (r) = Recorded By, (t) = Taken By, (c) = Cosigned By    Initials Name Provider Type    Mariana Barron OT Occupational Therapist              Occupational Therapy Education                 Title: PT OT SLP Therapies (Done)     Topic: Occupational Therapy (Done)     Point: ADL training (Done)     Description:   Instruct learner(s) on proper safety adaptation and remediation techniques during self care or transfers.   Instruct in proper use of assistive devices.              Learning Progress Summary           Patient Eager, E,TB,D,H, DU,VU by AR at 5/24/2022 1740   Family Eager, E,TB,D,H, DU,VU by AR at 5/24/2022 1740                   Point: Home exercise program (Done)     Description:   Instruct learner(s) on appropriate technique for monitoring, assisting and/or progressing therapeutic exercises/activities.              Learning Progress Summary           Patient Eager, E,TB,D,H, DU,VU by AR at 5/24/2022 1740   Family Eager, E,TB,D,H, DU,VU by AR at 5/24/2022 1740                   Point: Precautions (Done)     Description:   Instruct learner(s) on prescribed precautions during self-care and functional transfers.              Learning Progress Summary           Patient Eager, E,TB,D,H, DU,VU by AR at 5/24/2022 1740   Family Eager, E,TB,D,H, DU,VU by AR at 5/24/2022 1740                   Point: Body mechanics (Done)     Description:   Instruct learner(s) on proper positioning and spine alignment during self-care, functional mobility activities and/or exercises.               Learning Progress Summary           Patient Dimple, E,TB,D,H, DU,VU by AR at 5/24/2022 1740   Family Ianer, E,TB,D,H, DU,VU by AR at 5/24/2022 1740                               User Key     Initials Effective Dates Name Provider Type Discipline    AR 06/16/21 -  Mariana Arshad, OT Occupational Therapist OT              OT Recommendation and Plan  Planned Therapy Interventions (OT): BADL retraining, edema control/reduction, IADL retraining, occupation/activity based interventions, orthotic fabrication/fitting/training, patient/caregiver education/training, ROM/therapeutic exercise, transfer/mobility retraining  Therapy Frequency (OT): evaluation only  Plan of Care Review  Plan of Care Reviewed With: patient, spouse  Outcome Evaluation: Interscalene infusing at 4, no c/o pain. OT educated pt and spouse on R shoulder precautions, ADL retraining to maintain, sling management and RUE HEP. Pt tolerated R shoudler AAFE 120 with good teachback from spouse. Pt passed mobility screen, no PT needs. Recommend DC home with initial 24/7 assist.  Plan of Care Reviewed With: patient, spouse  Outcome Evaluation: Interscalene infusing at 4, no c/o pain. OT educated pt and spouse on R shoulder precautions, ADL retraining to maintain, sling management and RUE HEP. Pt tolerated R shoudler AAFE 120 with good teachback from spouse. Pt passed mobility screen, no PT needs. Recommend DC home with initial 24/7 assist.     Time Calculation:    Time Calculation- OT     Row Name 05/24/22 1740             Time Calculation- OT    OT Start Time 1355  -AR      OT Received On 05/24/22  -AR      OT Goal Re-Cert Due Date 06/03/22  -AR              Timed Charges    87117 - OT Therapeutic Exercise Minutes 16  -AR      98749 - OT Therapeutic Activity Minutes 9  -AR      24395 - OT Self Care/Mgmt Minutes 19  -AR              Untimed Charges    OT Eval/Re-eval Minutes 45  -AR              Total Minutes    Timed Charges Total Minutes 44  -AR       Untimed Charges Total Minutes 45  -AR       Total Minutes 89  -AR            User Key  (r) = Recorded By, (t) = Taken By, (c) = Cosigned By    Initials Name Provider Type    Mariana Barron OT Occupational Therapist              Therapy Charges for Today     Code Description Service Date Service Provider Modifiers Qty    88714516421 HC OT EVAL LOW COMPLEXITY 3 5/24/2022 Mariana Arshad OT GO 1    22982482863 HC OT THER PROC EA 15 MIN 5/24/2022 Mariana Arshad OT GO 1    68757097174 HC OT THERAPEUTIC ACT EA 15 MIN 5/24/2022 Mariana Arshad OT GO 1    76398920642 HC OT SELF CARE/MGMT/TRAIN EA 15 MIN 5/24/2022 Mariana Arshad OT GO 1    97167861138  OT THER SUPP EA 15 MIN 5/24/2022 Mariana Arshad OT GO 3             OT Discharge Summary  Anticipated Discharge Disposition (OT): home with 24/7 care  Reason for Discharge: Discharge from facility  Outcomes Achieved: Able to achieve all goals within established timeline  Discharge Destination: Home with assist    Mariana Arshad OT  5/24/2022

## 2022-05-24 NOTE — H&P
Pre-Op H&P  Michel Lopez II  [id number]  1965    Chief complaint: R shoulder pain    HPI:    Patient is a 56 y.o.male who presents with years of chronic, worsening right shoulder pain despite conservative management. He is scheduled for a right total shoulder arthroplasty with biceps tenodesis. He denies any significant cardiopulmonary history. He does not take any blood thinners routinely.     Review of Systems:  General ROS: negative for chills, fever or skin lesions.  Cardiovascular ROS: no chest pain or dyspnea on exertion  Respiratory ROS: no cough, shortness of breath, or wheezing    Allergies:   Allergies   Allergen Reactions   • Penicillins Hives   • Ace Inhibitors Cough       Home Meds:    No current facility-administered medications on file prior to encounter.     Current Outpatient Medications on File Prior to Encounter   Medication Sig Dispense Refill   • Loratadine 10 MG capsule Take 10 mg by mouth Every Morning.     • losartan (COZAAR) 25 MG tablet Take 25 mg by mouth Daily.     • metoprolol tartrate (LOPRESSOR) 25 MG tablet Take 25 mg by mouth 2 (Two) Times a Day.     • Multiple Vitamins-Minerals (Multivitamin Adult, Minerals,) tablet Take 1 tablet by mouth Daily.     • Omega-3 Fatty Acids (SALMON OIL-1000 PO) Take 2,000 mg by mouth Daily.     • omeprazole (priLOSEC) 20 MG capsule Take 20 mg by mouth Daily.     • phytonadione (MEPHYTON, VITAMIN K) 5 MG tablet Take 5 mg by mouth Daily.     • vitamin D3 125 MCG (5000 UT) capsule capsule Take 5,000 Units by mouth Daily.     • meloxicam (MOBIC) 15 MG tablet Take 15 mg by mouth Daily.         PMH:   Past Medical History:   Diagnosis Date   • Arthritis     shoulders   • Hypertension    • Sleep apnea     Does not use CPAP machine.    • Wears glasses    • Wears partial dentures     both upper and lower partials     PSH:    Past Surgical History:   Procedure Laterality Date   • AMPUTATION FINGER / THUMB      reattached  4 fingers including thumb in  1993 in work accident   • COLONOSCOPY      last colonoscopy was with in last 5 years   • KNEE ARTHROPLASTY, PARTIAL REPLACEMENT      left   • SHOULDER ARTHROSCOPY      right   • SHOULDER ARTHROSCOPY W/ CAPSULAR REPAIR Left 10/12/2021    Procedure: ARTHROSCOPY LEFT SHOULDER WITH ROTATOR CUFF REPAIR;  Surgeon: Obi Hernández Jr., MD;  Location: [facility];  Service: Orthopedics;  Laterality: Left;       Immunization History:  Influenza: UTD  Pneumococcal: UTD  Tetanus: UTD    Social History:   Tobacco:   Social History     Tobacco Use   Smoking Status Former Smoker   • Years: 8.00   • Types: Cigars   • Quit date:    • Years since quittin.3   Smokeless Tobacco Never Used   Tobacco Comment    quit 4 years ago      Alcohol:     Social History     Substance and Sexual Activity   Alcohol Use Yes   • Alcohol/week: 56.0 standard drinks   • Types: 56 Shots of liquor per week    Comment: 8 shots in a day        Vitals:           /95 (BP Location: Right arm, Patient Position: Lying)   Pulse 84   Temp 98.4 °F (36.9 °C) (Temporal)   Resp 18   SpO2 94%     Physical Exam:  General Appearance:    Alert, cooperative, no distress, appears stated age   Head:    Normocephalic, without obvious abnormality, atraumatic   Lungs:     Clear to auscultation bilaterally, respirations unlabored    Heart:   Regular rate and rhythm, no murmur, rub or gallop    Abdomen:    Soft, nontender. No rigidity or guarding.    Breast Exam:    deferred   Genitalia:    deferred   Extremities:   No cyanosis or edema   Skin:   Skin color, texture, turgor normal, no rashes or lesions   Neurologic:   Grossly intact   Results Review  LABS:  Lab Results   Component Value Date    WBC 5.30 2022    HGB 15.0 2022    HCT 44.4 2022    .8 (H) 2022     2022    GLUCOSE 105 (H) 2022    BUN 11 2022    CREATININE 0.85 2022    EGFRIFNONA 107 2021     2022    K 5.1 2022      05/17/2022    CO2 28.0 05/17/2022    CALCIUM 10.0 05/17/2022        I reviewed the patient's new imaging results and agree with the interpretation.    Impression: R shoulder pain    Plan: R total shoulder arthroplasty with biceps tenodesis     Daniel Truong PA-C   5/24/2022   06:49 EDT

## 2022-05-24 NOTE — ANESTHESIA PROCEDURE NOTES
INTERSCALENE CATH      Patient reassessed immediately prior to procedure    Patient location during procedure: pre-op  Start time: 5/24/2022 7:04 AM  Stop time: 5/24/2022 7:17 AM  Reason for block: at surgeon's request and post-op pain management  Performed by  CRNA/CAA: Saulo Goldstein, CRNA  Assisted by: Sun Mix, GINANA: Danyelle Michelle SRNA  Preanesthetic Checklist  Completed: patient identified, IV checked, site marked, risks and benefits discussed, surgical consent, monitors and equipment checked, pre-op evaluation and timeout performed  Prep:  Pt Position: left lateral decubitus  Sterile barriers:cap, gloves, mask and sterile barriers  Prep: ChloraPrep  Patient monitoring: blood pressure monitoring, continuous pulse oximetry and EKG  Procedure    Sedation: yes  Performed under: local infiltration  Guidance:ultrasound guided  Images:still images obtained, printed/placed on chart    Block Type:interscalene  Injection Technique:catheter  Needle Type:Tuohy and echogenic  Needle Gauge:18 G  Resistance on Injection: none  Catheter Size:20 G (20g)  Cath Depth at skin: 10 cm    Medications Used: bupivacaine PF (MARCAINE) 0.25 % injection, 15 mL  Med administered at 5/24/2022 7:05 AM      Medications  Preservative Free Saline:5ml    Post Assessment  Injection Assessment: negative aspiration for heme, no paresthesia on injection and incremental injection  Patient Tolerance:comfortable throughout block  Complications:no  Additional Notes  Procedure:                 The pt was placed in semifowlers position with a slight tilt of the thorax contralateral to the insertion site.  The Insertion Site was prepped and draped in sterile fashion.  The pt was anesthetized with  IV Sedation( see meds) and  Skin and cutaneous tissue was infiltrated and anesthetized with 1% Lidocaine 3 mls via a 25g needle.  Utilizing ultrasound guidance, a BBraun 4 inch 18 g Contiplex echogenic touhy needle was advanced in-plane.   Hydro dissection of tissue was achieved with Normal saline. Major vessels(carotid and Internal Jugular) where visualized as the brachial plexus was approached at the approximate level of C-7/ T-1.  Cervical 5 and Branches of Cervical 6 nerve roots where visualized and the needle tip was placed posterior at the level of C-6 roots.  LA spread was visualized and injection was made incrementally every 5 mls with aspiration. Injection pressure was normal or little, there was no intraneural injection, no vascular injection.      The BBraun 20 g wire stylet catheter was then placed under US guidance on the posterior aspect of the Brachial Plexus. The tuohy was removed and the location of catheter was confirmed with NS injection visualized with US . The skin was sealed with exofin tissue adhesive at catheter insertion site.  Skin was prepped with benzoin and the catheter was secured with steristrips and a CHG tegaderm. Appropriate labels applied. Thank You.

## 2022-05-24 NOTE — ANESTHESIA PREPROCEDURE EVALUATION
Anesthesia Evaluation                  Airway   Mallampati: I  TM distance: >3 FB  Neck ROM: full  No difficulty expected  Dental      Pulmonary    (+) sleep apnea,   Cardiovascular     ECG reviewed    (+) hypertension,       Neuro/Psych  GI/Hepatic/Renal/Endo    (+) morbid obesity,      Musculoskeletal     Abdominal    Substance History      OB/GYN          Other   arthritis,                      Anesthesia Plan    ASA 3     general   (Isnb/c right site marked)  intravenous induction     Anesthetic plan, all risks, benefits, and alternatives have been provided, discussed and informed consent has been obtained with: patient.    Plan discussed with CRNA.        CODE STATUS:

## 2022-05-26 NOTE — PROGRESS NOTES
[facility]    Nerve Cath Post Op Call    Patient Name: Michel Lopez II  :  1965  MRN:  [id number]  Date of Discharge: 2022    Nerve Cath Post Op Call:    Catheter Plan:Patient called, No answer. Message left to call [facility] pain service for any questions or complaints  Patient/Family instructed to call ON CALL anesthesia provider for any questions or problems.  Patient Follow Up:

## 2022-05-27 NOTE — PROGRESS NOTES
[facility]    Nerve Cath Post Op Call    Patient Name: Michel Lopez II  :  1965  MRN:  [id number]  Date of Discharge: 2022    Nerve Cath Post Op Call:    Catheter Plan:Patient called, No answer. Message left to call [facility] pain service for any questions or complaints  Patient/Family instructed to call ON CALL anesthesia provider for any questions or problems.  Patient Follow Up:

## 2022-05-29 NOTE — PROGRESS NOTES
[facility]    Nerve Cath Post Op Call    Patient Name: Michel Lopez II  :  1965  MRN:  [id number]  Date of Discharge: 2022    Nerve Cath Post Op Call:    Analgesia:Fair  Catheter Plan:Patient/Family member report nerve catheter previously discontinued, tip intact  Patient/Family instructed to call ON CALL anesthesia provider for any questions or problems.  Patient Follow Up:

## 2023-09-04 RX ORDER — HYDROCHLOROTHIAZIDE 25 MG/1
25 TABLET ORAL
COMMUNITY
End: 2023-09-05

## 2023-09-05 ENCOUNTER — OFFICE VISIT (OUTPATIENT)
Dept: FAMILY MEDICINE CLINIC | Facility: CLINIC | Age: 58
End: 2023-09-05
Payer: COMMERCIAL

## 2023-09-05 VITALS
OXYGEN SATURATION: 96 % | HEIGHT: 67 IN | DIASTOLIC BLOOD PRESSURE: 90 MMHG | SYSTOLIC BLOOD PRESSURE: 130 MMHG | HEART RATE: 82 BPM | TEMPERATURE: 98.7 F | BODY MASS INDEX: 40.06 KG/M2 | WEIGHT: 255.2 LBS

## 2023-09-05 DIAGNOSIS — G89.29 CHRONIC BILATERAL LOW BACK PAIN WITH LEFT-SIDED SCIATICA: Primary | ICD-10-CM

## 2023-09-05 DIAGNOSIS — M54.42 CHRONIC BILATERAL LOW BACK PAIN WITH LEFT-SIDED SCIATICA: Primary | ICD-10-CM

## 2023-09-05 PROCEDURE — 99203 OFFICE O/P NEW LOW 30 MIN: CPT | Performed by: FAMILY MEDICINE

## 2023-09-05 RX ORDER — PREGABALIN 75 MG/1
75 CAPSULE ORAL 2 TIMES DAILY
COMMUNITY
Start: 2023-08-30

## 2023-09-05 RX ORDER — NALTREXONE HYDROCHLORIDE 50 MG/1
50 TABLET, FILM COATED ORAL DAILY
COMMUNITY

## 2023-09-05 RX ORDER — DULOXETIN HYDROCHLORIDE 20 MG/1
20 CAPSULE, DELAYED RELEASE ORAL 2 TIMES DAILY
COMMUNITY
Start: 2023-07-26

## 2023-09-05 RX ORDER — METOPROLOL TARTRATE 100 MG/1
100 TABLET ORAL 2 TIMES DAILY
COMMUNITY
Start: 2023-07-12

## 2023-09-07 DIAGNOSIS — R73.9 ELEVATED BLOOD SUGAR: ICD-10-CM

## 2023-09-07 DIAGNOSIS — R74.8 ELEVATED LIVER ENZYMES: Primary | ICD-10-CM

## 2023-09-07 DIAGNOSIS — E78.2 MIXED HYPERLIPIDEMIA: ICD-10-CM

## 2023-09-12 ENCOUNTER — LAB (OUTPATIENT)
Dept: LAB | Facility: HOSPITAL | Age: 58
End: 2023-09-12
Payer: COMMERCIAL

## 2023-09-12 DIAGNOSIS — R74.8 ELEVATED LIVER ENZYMES: ICD-10-CM

## 2023-09-12 DIAGNOSIS — R73.9 ELEVATED BLOOD SUGAR: ICD-10-CM

## 2023-09-12 LAB
ALBUMIN SERPL-MCNC: 4.4 G/DL (ref 3.5–5.2)
ALBUMIN/GLOB SERPL: 1.3 G/DL
ALP SERPL-CCNC: 98 U/L (ref 39–117)
ALT SERPL W P-5'-P-CCNC: 53 U/L (ref 1–41)
ANION GAP SERPL CALCULATED.3IONS-SCNC: 11.9 MMOL/L (ref 5–15)
AST SERPL-CCNC: 43 U/L (ref 1–40)
BILIRUB SERPL-MCNC: 0.3 MG/DL (ref 0–1.2)
BUN SERPL-MCNC: 16 MG/DL (ref 6–20)
BUN/CREAT SERPL: 14.7 (ref 7–25)
CALCIUM SPEC-SCNC: 10.4 MG/DL (ref 8.6–10.5)
CHLORIDE SERPL-SCNC: 103 MMOL/L (ref 98–107)
CO2 SERPL-SCNC: 27.1 MMOL/L (ref 22–29)
CREAT SERPL-MCNC: 1.09 MG/DL (ref 0.76–1.27)
EGFRCR SERPLBLD CKD-EPI 2021: 78.2 ML/MIN/1.73
GLOBULIN UR ELPH-MCNC: 3.5 GM/DL
GLUCOSE SERPL-MCNC: 95 MG/DL (ref 65–99)
HCV AB SER DONR QL: NORMAL
POTASSIUM SERPL-SCNC: 5.7 MMOL/L (ref 3.5–5.2)
PROT SERPL-MCNC: 7.9 G/DL (ref 6–8.5)
SODIUM SERPL-SCNC: 142 MMOL/L (ref 136–145)

## 2023-09-12 PROCEDURE — 83036 HEMOGLOBIN GLYCOSYLATED A1C: CPT

## 2023-09-12 PROCEDURE — 80053 COMPREHEN METABOLIC PANEL: CPT

## 2023-09-12 PROCEDURE — 86803 HEPATITIS C AB TEST: CPT

## 2023-09-13 LAB — HBA1C MFR BLD: 5.8 % (ref 4.8–5.6)

## 2023-09-21 ENCOUNTER — OFFICE VISIT (OUTPATIENT)
Dept: FAMILY MEDICINE CLINIC | Facility: CLINIC | Age: 58
End: 2023-09-21
Payer: COMMERCIAL

## 2023-09-21 VITALS
BODY MASS INDEX: 40.02 KG/M2 | OXYGEN SATURATION: 96 % | DIASTOLIC BLOOD PRESSURE: 80 MMHG | HEIGHT: 67 IN | HEART RATE: 93 BPM | SYSTOLIC BLOOD PRESSURE: 122 MMHG | TEMPERATURE: 97.8 F | RESPIRATION RATE: 24 BRPM | WEIGHT: 255 LBS

## 2023-09-21 DIAGNOSIS — K21.9 GASTROESOPHAGEAL REFLUX DISEASE WITHOUT ESOPHAGITIS: ICD-10-CM

## 2023-09-21 DIAGNOSIS — Z28.21 IMMUNIZATION REFUSED: ICD-10-CM

## 2023-09-21 DIAGNOSIS — E66.01 MORBID OBESITY WITH BMI OF 40.0-44.9, ADULT: Primary | ICD-10-CM

## 2023-09-21 DIAGNOSIS — K76.0 FATTY LIVER: ICD-10-CM

## 2023-09-21 PROCEDURE — 99214 OFFICE O/P EST MOD 30 MIN: CPT | Performed by: FAMILY MEDICINE

## 2023-09-21 RX ORDER — SEMAGLUTIDE 0.5 MG/.5ML
0.5 INJECTION, SOLUTION SUBCUTANEOUS WEEKLY
Qty: 2 ML | Refills: 0 | Status: SHIPPED | OUTPATIENT
Start: 2023-09-21

## 2023-09-21 NOTE — PATIENT INSTRUCTIONS
Advance Care Planning and Advance Directives     You make decisions on a daily basis - decisions about where you want to live, your career, your home, your life. Perhaps one of the most important decisions you face is your choice for future medical care. Take time to talk with your family and your healthcare team and start planning today.  Advance Care Planning is a process that can help you:  Understand possible future healthcare decisions in light of your own experiences  Reflect on those decision in light of your goals and values  Discuss your decisions with those closest to you and the healthcare professionals that care for you  Make a plan by creating a document that reflects your wishes    Surrogate Decision Maker  In the event of a medical emergency, which has left you unable to communicate or to make your own decisions, you would need someone to make decisions for you.  It is important to discuss your preferences for medical treatment with this person while you are in good health.     Qualities of a surrogate decision maker:  Willing to take on this role and responsibility  Knows what you want for future medical care  Willing to follow your wishes even if they don't agree with them  Able to make difficult medical decisions under stressful circumstances    Advance Directives  These are legal documents you can create that will guide your healthcare team and decision maker(s) when needed. These documents can be stored in the electronic medical record.    Living Will - a legal document to guide your care if you have a terminal condition or a serious illness and are unable to communicate. States vary by statute in document names/types, but most forms may include one or more of the following:        -  Directions regarding life-prolonging treatments        -  Directions regarding artificially provided nutrition/hydration        -  Choosing a healthcare decision maker        -  Direction regarding organ/tissue  donation    Durable Power of  for Healthcare - this document names an -in-fact to make medical decisions for you, but it may also allow this person to make personal and financial decisions for you. Please seek the advice of an  if you need this type of document.    **Advance Directives are not required and no one may discriminate against you if you do not sign one.    Medical Orders  Many states allow specific forms/orders signed by your physician to record your wishes for medical treatment in your current state of health. This form, signed in personal communication with your physician, addresses resuscitation and other medical interventions that you may or may not want.      For more information or to schedule a time with a [facility] Advance Care Planning Facilitator contact: [email or web address] or call [phone] and someone will contact you directly.

## 2023-09-21 NOTE — PROGRESS NOTES
Male Physical Note      Date: 2023   Patient Name: Michel Lopez II  : 1965   MRN: [id number]     Chief Complaint:    Chief Complaint   Patient presents with    Follow-up     Medication discussion       History of Present Illness: Michel Lopez II is a 58 y.o. male who is here today for their annual health maintenance and physical.  Patient has been doing relatively well since last being seen.  He is seen the pain specialist which are further adjusting medications.  He is no longer taking Lyrica and naltrexone has been doing relatively well.  Patient is up-to-date by his report with respect to his colon cancer screening.  [facility]  Patient has otherwise been doing relatively well without any difficulties.  He has continue with his medications without side effects.  Patient has continue with his usual activity, appetite and sleep.  Patient denies any other cardiovascular, respiratory, gastrointestinal, urologic or neurologic complaints.  He does continue with his persistent neck and back pain.      Subjective      Review of Systems:   Review of Systems   Constitutional:  Negative for activity change, appetite change and fatigue.   Respiratory:  Negative for cough and shortness of breath.    Cardiovascular:  Negative for chest pain, palpitations and leg swelling.   Gastrointestinal:  Negative for abdominal distention, abdominal pain, blood in stool, constipation, diarrhea, nausea, vomiting, GERD and indigestion.   Genitourinary:  Negative for dysuria, flank pain, frequency and urgency.   Musculoskeletal:  Positive for arthralgias, back pain and myalgias.   Neurological:  Negative for dizziness, tremors, syncope, weakness, light-headedness, headache and memory problem.   Psychiatric/Behavioral:  Negative for sleep disturbance and depressed mood. The patient is not nervous/anxious.      Past Medical History, Social History, Family History and Care Team  were all reviewed with patient and updated as appropriate.     Medications:     Current Outpatient Medications:     DULoxetine (CYMBALTA) 20 MG capsule, Take 2 capsules by mouth 2 (Two) Times a Day., Disp: , Rfl:     Loratadine 10 MG capsule, Take 1 capsule by mouth Every Morning., Disp: , Rfl:     losartan (COZAAR) 25 MG tablet, Take 1 tablet by mouth Daily., Disp: , Rfl:     metoprolol tartrate (LOPRESSOR) 100 MG tablet, Take 1 tablet by mouth 2 (Two) Times a Day., Disp: , Rfl:     Multiple Vitamins-Minerals (Multivitamin Adult, Minerals,) tablet, Take 1 tablet by mouth Daily., Disp: , Rfl:     omeprazole (priLOSEC) 20 MG capsule, Take 1 capsule by mouth Daily., Disp: , Rfl:     Semaglutide-Weight Management (Wegovy) 0.5 MG/0.5ML solution auto-injector, Inject 0.5 mL under the skin into the appropriate area as directed 1 (One) Time Per Week., Disp: 2 mL, Rfl: 0    Allergies:   Allergies   Allergen Reactions    Penicillins Hives    Ace Inhibitors Cough       Immunizations:  Health Maintenance Summary            Overdue - COLORECTAL CANCER SCREENING (COLONOSCOPY - Every 10 Years) Overdue - never done      No completion, postpone, or frequency change history exists for this topic.              Overdue - Pneumococcal Vaccine 0-64 (1 - PCV) Overdue - never done      No completion, postpone, or frequency change history exists for this topic.              Overdue - TDAP/TD VACCINES (1 - Tdap) Overdue - never done      No completion, postpone, or frequency change history exists for this topic.              Overdue - ZOSTER VACCINE (1 of 2) Overdue - never done      No completion, postpone, or frequency change history exists for this topic.              Overdue - LIPID PANEL (Yearly) Overdue - never done      No completion, postpone, or frequency change history exists for this topic.              Postponed - COVID-19 Vaccine (4 - Pfizer series) Postponed until 9/23/2024 09/21/2023  Postponed until 9/23/2024 by  Jatinder Coulter MD (Patient Refused)    11/19/2021  Imm Admin: COVID-19 (MODERNA) 1st,2nd,3rd Dose Monovalent    04/13/2021  Imm Admin: COVID-19 (PFIZER) Purple Cap Monovalent    03/23/2021  Imm Admin: COVID-19 (PFIZER) Purple Cap Monovalent              INFLUENZA VACCINE (Yearly - October to March) Next due on 10/1/2023      No completion, postpone, or frequency change history exists for this topic.              ANNUAL PHYSICAL (Yearly) Next due on 9/21/2024 09/21/2023  Done              BMI FOLLOWUP (Yearly) Next due on 9/21/2024 09/21/2023  Registry Metric: Last BMI Follow-up    09/05/2023  [employer]: WORKFLOW - QUALITY MEASUREMENT - BMI FOLLOW UP CARE PLAN DOCUMENTED              HEPATITIS C SCREENING  Completed      09/12/2023  Hepatitis C Antibody                    No orders of the defined types were placed in this encounter.      [facility] Scre[facility]:   [facility]  We are attempting to receive a copy.  Last Completed Colonoscopy       This patient has no relevant Health Maintenance data.          CT for Smoker (Age 50-80, 20pk yr within last 15 years): Deferred.  Bone Density/DEXA (high risk): Deferred.  Hep C (Age 18-79 once): Will be ordered.  HIV (Age 15-65 once) : Deferred.  PSA (Over age 50, C Level Recommendation): Will be ordered.  US Aorta (For male smokers, age 65): Deferred.  A1c:   Hemoglobin A1C   Date Value Ref Range Status   09/12/2023 5.80 (H) 4.80 - 5.60 % Final     Lipid panel: No results found for: LABLIPI    The ASCVD Risk score (Yuri DK, et al., 2019) failed to calculate for the following reasons:    Cannot find a previous HDL lab    Cannot find a previous total cholesterol lab    Dermatology: Advise if necessary.  Ophthalmologist: Up-to-date.  Dentist: Up-to-date.    Tobacco Use: High Risk    Smoking Tobacco Use: Every Day    Smokeless Tobacco Use: Never    Passive Exposure: Not on file       Social History     Substance and Sexual Activity   Alcohol  "Use Not Currently    Alcohol/week: 56.0 standard drinks    Types: 56 Shots of liquor per week    Comment: 8 shots in a day         Social History     Substance and Sexual Activity   Drug Use Never        Diet/Physical activity: Well-balanced diet/daily exercise.    Sexual health: No issues at present time.    PHQ-2 Depression Screening  PHQ-9 Total Score: 0  0    Measures:   Advanced Care Planning:   Patient does not have an advance directive, information provided.    Smoking Cessation:   Non-smoker. Current electronic cigarettes.     Objective     Physical Exam:  Vital Signs:   Vitals:    09/21/23 1055   BP: 122/80   BP Location: Left arm   Patient Position: Sitting   Cuff Size: Large Adult   Pulse: 93   Resp: 24   Temp: 97.8 °F (36.6 °C)   SpO2: 96%   Weight: 116 kg (255 lb)   Height: 170.2 cm (67\")     Body mass index is 39.94 kg/m².     Physical Exam  Vitals and nursing note reviewed.   Constitutional:       Appearance: Normal appearance.   HENT:      Head: Normocephalic and atraumatic.      Nose: Nose normal.      Mouth/Throat:      Pharynx: Oropharynx is clear.   Eyes:      Extraocular Movements: Extraocular movements intact.      Pupils: Pupils are equal, round, and reactive to light.   Neck:      Thyroid: No thyroid mass or thyromegaly.      Trachea: Trachea normal.   Cardiovascular:      Rate and Rhythm: Normal rate and regular rhythm.      Pulses: Normal pulses. No decreased pulses.      Heart sounds: Normal heart sounds.   Pulmonary:      Effort: Pulmonary effort is normal.      Breath sounds: Normal breath sounds.   Abdominal:      General: Abdomen is flat. Bowel sounds are normal.      Palpations: Abdomen is soft.      Tenderness: There is no abdominal tenderness.   Musculoskeletal:      Cervical back: Neck supple.      Right lower leg: No edema.      Left lower leg: No edema.   Lymphadenopathy:      Cervical: No cervical adenopathy.   Skin:     General: Skin is warm and dry.   Neurological:      " General: No focal deficit present.      Mental Status: He is alert and oriented to person, place, and time.      Sensory: Sensation is intact.      Motor: Motor function is intact.      Coordination: Coordination is intact.   Psychiatric:         Attention and Perception: Attention normal.         Mood and Affect: Mood normal.         Speech: Speech normal.         Behavior: Behavior normal.        POCT Results (if applicable):   Results for orders placed or performed in visit on 09/12/23   Comprehensive Metabolic Panel    Specimen: Blood   Result Value Ref Range    Glucose 95 65 - 99 mg/dL    BUN 16 6 - 20 mg/dL    Creatinine 1.09 0.76 - 1.27 mg/dL    Sodium 142 136 - 145 mmol/L    Potassium 5.7 (H) 3.5 - 5.2 mmol/L    Chloride 103 98 - 107 mmol/L    CO2 27.1 22.0 - 29.0 mmol/L    Calcium 10.4 8.6 - 10.5 mg/dL    Total Protein 7.9 6.0 - 8.5 g/dL    Albumin 4.4 3.5 - 5.2 g/dL    ALT (SGPT) 53 (H) 1 - 41 U/L    AST (SGOT) 43 (H) 1 - 40 U/L    Alkaline Phosphatase 98 39 - 117 U/L    Total Bilirubin 0.3 0.0 - 1.2 mg/dL    Globulin 3.5 gm/dL    A/G Ratio 1.3 g/dL    BUN/Creatinine Ratio 14.7 7.0 - 25.0    Anion Gap 11.9 5.0 - 15.0 mmol/L    eGFR 78.2 >60.0 mL/min/1.73   Hemoglobin A1c    Specimen: Blood   Result Value Ref Range    Hemoglobin A1C 5.80 (H) 4.80 - 5.60 %   Hepatitis C Antibody    Specimen: Blood   Result Value Ref Range    Hepatitis C Ab Non-Reactive Non-Reactive       Procedures    Assessment / Plan      Assessment/Plan:   Diagnoses and all orders for this visit:    1. Morbid obesity with BMI of 40.0-44.9, adult (Primary)  We have discussed options with patient and since he does not have a family history of thyroid cancer we will pursue with a GLP-1 agonist.  He is finding some difficulty to do exercise due to his chronic back pain.  We did discuss the side effects of medications and hopefully he will be able to tolerate the slow increase in dose.  We will reassess in 6 weeks time.  If he has other issues  before that time he will contact us.  -     Semaglutide-Weight Management (Wegovy) 0.5 MG/0.5ML solution auto-injector; Inject 0.5 mL under the skin into the appropriate area as directed 1 (One) Time Per Week.  Dispense: 2 mL; Refill: 0    2. Fatty liver   Patient does have a history of fatty liver.  The GLP-1 agonist will be appropriate for treatment.  We have also encouraged aerobic exercise as well.  We will continue to monitor his liver function test and will make adjustments as necessary.    3. Immunization refused   [facility]  He does not wish to pursue with the pneumococcal vaccine at present time.  He understands the risk of not receiving the medication.  We will discuss during next visit.    4.  Gastroesophageal reflux disease without esophagitis   Patient does have a history of having reflux.  We have discussed the potential worsening of symptoms with the GLP-1 agonist.  We will continue to pursue and treat accordingly.  If he does develop alarm symptoms we will refer for an EGD.    Healthcare Maintenance:  Counseling provided based on age appropriate USPSTF guidelines.       Michel Wallerclare Lopez II voices understanding and acceptance of this advice and will call back with any further questions or concerns. AVS with preventive healthcare tips printed for patient.     Follow Up:   Return in about 6 weeks (around 11/2/2023) for Recheck.    At [facility], we believe that sharing information builds trust and better relationships. You are receiving this note because you recently visited [facility]. It is possible you will see health information before a provider has talked with you about it. This kind of information can be easy to misunderstand. To help you fully understand what it means for your health, we urge you to discuss this note with your provider.    Jatinder Coulter MD  [facility] LancasterAnswers submitted by the patient for this visit:  Primary  Reason for Visit (Submitted on 9/20/2023)  What is the primary reason for your visit?: Other  Other (Submitted on 9/20/2023)  Please describe your symptoms.: Follow-up to discuss Wegovy and other options.  Have you had these symptoms before?: Yes  How long have you been having these symptoms?: Greater than 2 weeks  Please list any medications you are currently taking for this condition.: Duloxatine 40mg in my medication list needs to be updated to two capsules 2x/day.  Please describe any probable cause for these symptoms. : Im fat.

## 2023-09-22 LAB
CHOLEST SERPL-MCNC: 289 MG/DL (ref 0–200)
HDLC SERPL-MCNC: 85 MG/DL (ref 40–60)
LDLC SERPL CALC-MCNC: 186 MG/DL
TRIGL SERPL-MCNC: 151 MG/DL (ref 0–150)

## 2023-10-16 ENCOUNTER — PATIENT ROUNDING (BHMG ONLY) (OUTPATIENT)
Dept: FAMILY MEDICINE CLINIC | Facility: CLINIC | Age: 58
End: 2023-10-16
Payer: COMMERCIAL

## 2023-10-16 NOTE — PROGRESS NOTES
A Mapbar message has been sent to the patient for PATIENT   ROUNDING with [facility]

## 2023-10-20 ENCOUNTER — PATIENT MESSAGE (OUTPATIENT)
Dept: FAMILY MEDICINE CLINIC | Facility: CLINIC | Age: 58
End: 2023-10-20
Payer: COMMERCIAL

## 2023-10-20 DIAGNOSIS — E66.01 MORBID OBESITY WITH BMI OF 40.0-44.9, ADULT: Primary | ICD-10-CM

## 2023-10-24 RX ORDER — SEMAGLUTIDE 1 MG/.5ML
1 INJECTION, SOLUTION SUBCUTANEOUS WEEKLY
Qty: 2 ML | Refills: 11 | Status: SHIPPED | OUTPATIENT
Start: 2023-10-24

## 2023-11-03 ENCOUNTER — OFFICE VISIT (OUTPATIENT)
Dept: FAMILY MEDICINE CLINIC | Facility: CLINIC | Age: 58
End: 2023-11-03
Payer: COMMERCIAL

## 2023-11-03 VITALS
DIASTOLIC BLOOD PRESSURE: 80 MMHG | SYSTOLIC BLOOD PRESSURE: 118 MMHG | TEMPERATURE: 98.6 F | OXYGEN SATURATION: 96 % | HEIGHT: 67 IN | BODY MASS INDEX: 39.9 KG/M2 | WEIGHT: 254.2 LBS | HEART RATE: 73 BPM

## 2023-11-03 DIAGNOSIS — R74.8 ELEVATED LIVER ENZYMES: ICD-10-CM

## 2023-11-03 DIAGNOSIS — R73.9 ELEVATED BLOOD SUGAR: ICD-10-CM

## 2023-11-03 DIAGNOSIS — F17.211 CIGARETTE NICOTINE DEPENDENCE IN REMISSION: ICD-10-CM

## 2023-11-03 DIAGNOSIS — Z00.00 WELL ADULT EXAM: Primary | ICD-10-CM

## 2023-11-03 DIAGNOSIS — K21.9 GASTROESOPHAGEAL REFLUX DISEASE WITHOUT ESOPHAGITIS: ICD-10-CM

## 2023-11-03 DIAGNOSIS — Z28.21 IMMUNIZATION REFUSED: ICD-10-CM

## 2023-11-03 DIAGNOSIS — K76.0 FATTY LIVER: ICD-10-CM

## 2023-11-03 DIAGNOSIS — E66.01 MORBID OBESITY WITH BMI OF 40.0-44.9, ADULT: ICD-10-CM

## 2023-11-03 DIAGNOSIS — E78.2 MIXED HYPERLIPIDEMIA: ICD-10-CM

## 2023-11-03 PROBLEM — E66.3 OVERWEIGHT: Status: ACTIVE | Noted: 2023-10-31

## 2023-11-03 PROBLEM — M54.50 LOW BACK PAIN: Status: ACTIVE | Noted: 2023-10-31

## 2023-11-03 PROBLEM — M54.16 LUMBAR RADICULOPATHY: Status: ACTIVE | Noted: 2023-10-31

## 2023-11-03 RX ORDER — FLUOROURACIL 50 MG/G
CREAM TOPICAL
COMMUNITY
Start: 2023-10-23

## 2023-11-03 RX ORDER — SEMAGLUTIDE 1 MG/.5ML
1 INJECTION, SOLUTION SUBCUTANEOUS WEEKLY
Qty: 2 ML | Refills: 11 | Status: SHIPPED | OUTPATIENT
Start: 2023-11-03

## 2023-11-03 RX ORDER — CALCIPOTRIENE 50 UG/G
CREAM TOPICAL
COMMUNITY
Start: 2023-10-23

## 2023-11-03 RX ORDER — TRIAMCINOLONE ACETONIDE 1 MG/G
OINTMENT TOPICAL
COMMUNITY
Start: 2023-10-23

## 2023-11-03 NOTE — PROGRESS NOTES
Male Physical Note      Date: 2023   Patient Name: Michel Lopez II  : 1965   MRN: [id number]     Chief Complaint:    Chief Complaint   Patient presents with    Annual Exam       History of Present Illness: Michel Lopez II is a 58 y.o. male who is here today for their annual health maintenance and physical.  Patient's been doing relatively well since last being seen.  He does continue to follow-up with the [facility] and has had recent procedures performed including laboratory data back in August.  Patient has been doing relatively well.  He has tolerated the GLP-1 agonist without complaints.  He has otherwise been doing well except that he has not been able to get a recent dose from the GLP-1 agonist.  He denies any other complaints except for his usual arthralgias and myalgias.  Patient has continue with his usual activity, appetite and sleep.  Patient denies any other cardiovascular, respiratory, gastrointestinal, urologic or neurologic complaints.      Subjective      Review of Systems:   Review of Systems   Constitutional:  Negative for activity change, appetite change and fatigue.   Respiratory:  Negative for cough and shortness of breath.    Cardiovascular:  Negative for chest pain, palpitations and leg swelling.   Gastrointestinal:  Negative for abdominal distention, abdominal pain, blood in stool, constipation, diarrhea, nausea, vomiting, GERD and indigestion.   Genitourinary:  Negative for dysuria, flank pain, frequency and urgency.   Musculoskeletal:  Positive for arthralgias, back pain and gait problem. Negative for joint swelling and myalgias.   Neurological:  Positive for numbness. Negative for dizziness, tremors, weakness, light-headedness, headache, memory problem and confusion.   Psychiatric/Behavioral:  Negative for sleep disturbance and depressed mood. The patient is not nervous/anxious.        Past Medical History, Social History, Family History and Care  Team were all reviewed with patient and updated as appropriate.     Medications:     Current Outpatient Medications:     calcipotriene (DOVONEX) 0.005 % cream, , Disp: , Rfl:     DULoxetine (CYMBALTA) 20 MG capsule, Take 2 capsules by mouth 2 (Two) Times a Day., Disp: , Rfl:     fluorouracil (EFUDEX) 5 % cream, , Disp: , Rfl:     Loratadine 10 MG capsule, Take 1 capsule by mouth Every Morning., Disp: , Rfl:     losartan (COZAAR) 25 MG tablet, Take 1 tablet by mouth Daily., Disp: , Rfl:     metoprolol tartrate (LOPRESSOR) 100 MG tablet, Take 1 tablet by mouth 2 (Two) Times a Day., Disp: , Rfl:     Multiple Vitamins-Minerals (Multivitamin Adult, Minerals,) tablet, Take 1 tablet by mouth Daily., Disp: , Rfl:     omeprazole (priLOSEC) 20 MG capsule, Take 1 capsule by mouth Daily., Disp: , Rfl:     Semaglutide-Weight Management (Wegovy) 1 MG/0.5ML solution auto-injector, Inject 0.5 mL under the skin into the appropriate area as directed 1 (One) Time Per Week., Disp: 2 mL, Rfl: 11    triamcinolone (KENALOG) 0.1 % ointment, , Disp: , Rfl:     Allergies:   Allergies   Allergen Reactions    Penicillins Hives    Ace Inhibitors Cough       Immunizations:  Health Maintenance Summary            Postponed - INFLUENZA VACCINE (Yearly - August to March) Postponed until 3/31/2024      11/03/2023  Postponed until 3/31/2024 by Jatinder Coulter MD (Patient Refused)              Postponed - COVID-19 Vaccine (4 - 2023-24 season) Postponed until 9/23/2024 09/21/2023  Postponed until 9/23/2024 by Jatinder Coulter MD (Patient Refused)    11/19/2021  Imm Admin: COVID-19 (MODERNA) 1st,2nd,3rd Dose Monovalent    04/13/2021  Imm Admin: COVID-19 (PFIZER) Purple Cap Monovalent    03/23/2021  Imm Admin: COVID-19 (PFIZER) Purple Cap Monovalent              Postponed - Pneumococcal Vaccine 0-64 (1 - PCV) Postponed until 11/3/2024      11/03/2023  Postponed until 11/3/2024 by Jatinder Coulter MD (Patient Refused)  "             LIPID PANEL (Yearly) Next due on 6/15/2024      06/15/2023  Lipid Panel With / Chol / HDL Ratio              ANNUAL PHYSICAL (Yearly) Next due on 11/3/2024      11/03/2023  Done    09/21/2023  Done              BMI FOLLOWUP (Yearly) Next due on 11/3/2024      11/03/2023  Registry Metric: BMI Follow-up    09/05/2023  Smart[employer]: WORKFLOW - QUALITY MEASUREMENT - BMI FOLLOW UP CARE PLAN DOCUMENTED    09/05/2023  SmartData: WORKFLOW - QUALITY MEASUREMENT - DOCUMENTED WEIGHT FOLLOW-UP PLAN              COLORECTAL CANCER SCREENING (COLONOSCOPY - Every 5 Years) Next due on 2/5/2026 09/22/2023  Follow-up changed to COLONOSCOPY - Every 5 Years by Jatinder Coulter MD (Tubular adenoma)    02/05/2021  COLONOSCOPY (Done)              TDAP/TD VACCINES (2 - Td or Tdap) Next due on 5/17/2027 05/17/2017  Imm Admin: Tdap              ZOSTER VACCINE (Series Information) Completed      02/11/2020  Imm Admin: Shingrix    11/12/2019  Imm Admin: Shingrix              HEPATITIS C SCREENING  Completed      09/12/2023  Hepatitis C Antibody                    No orders of the defined types were placed in this encounter.      Colorectal Screening:   Up-to-date.  Last Completed Colonoscopy            COLORECTAL CANCER SCREENING (COLONOSCOPY - Every 5 Years) Next due on 2/5/2026 02/05/2021  COLONOSCOPY (Done)                  CT for Smoker (Age 50-80, 20pk yr within last 15 years): This needs to be ordered.   Bone Density/DEXA (high risk): Deferred.  Hep C (Age 18-79 once): Previously ordered.  HIV (Age 15-65 once) : Deferred.  PSA (Over age 50, C Level Recommendation): Previously ordered.  US Aorta (For male smokers, age 65): Deferred.  A1c:   Hemoglobin A1C   Date Value Ref Range Status   09/12/2023 5.80 (H) 4.80 - 5.60 % Final     Lipid panel: No results found for: \"LABLIPI\"    The 10-year ASCVD risk score (Yuri EPSTEIN, et al., 2019) is: 10.8%    Values used to calculate the score:      Age: 58 years      " "Sex: Male      Is Non- : No      Diabetic: No      Tobacco smoker: Yes      Systolic Blood Pressure: 118 mmHg      Is BP treated: No      HDL Cholesterol: 85 mg/dL      Total Cholesterol: 289 mg/dL    Dermatology: Advise if necessary.  Ophthalmologist: Up-to-date.  Dentist: Up-to-date.    Tobacco Use: High Risk (11/3/2023)    Patient History     Smoking Tobacco Use: Every Day     Smokeless Tobacco Use: Never     Passive Exposure: Not on file       Social History     Substance and Sexual Activity   Alcohol Use Not Currently    Alcohol/week: 56.0 standard drinks of alcohol    Types: 56 Shots of liquor per week    Comment: 8 shots in a day         Social History     Substance and Sexual Activity   Drug Use Never        Diet/Physical activity: Well-balanced diet/daily exercise.    Sexual health: No issues at present time.    PHQ-2 Depression Screening  PHQ-9 Total Score: 0      Measures:   Advanced Care Planning:   Patient does not have an advance directive, information provided.    Smoking Cessation:   Non-smoker.  Stopped in 2019.  Currently uses electronic cigarettes.     Objective     Physical Exam:  Vital Signs:   Vitals:    11/03/23 0953   BP: 118/80   BP Location: Left arm   Patient Position: Sitting   Cuff Size: Adult   Pulse: 73   Temp: 98.6 °F (37 °C)   TempSrc: Temporal   SpO2: 96%   Weight: 115 kg (254 lb 3.2 oz)   Height: 170.2 cm (67\")   PainSc:   7   PainLoc: Back     Body mass index is 39.81 kg/m².     Physical Exam  Vitals and nursing note reviewed.   Constitutional:       Appearance: Normal appearance.   HENT:      Head: Normocephalic and atraumatic.      Nose: Nose normal.      Mouth/Throat:      Pharynx: Oropharynx is clear.   Eyes:      Extraocular Movements: Extraocular movements intact.      Pupils: Pupils are equal, round, and reactive to light.   Neck:      Thyroid: No thyroid mass or thyromegaly.      Trachea: Trachea normal.   Cardiovascular:      Rate and Rhythm: " Normal rate and regular rhythm.      Pulses: Normal pulses. No decreased pulses.      Heart sounds: Normal heart sounds.   Pulmonary:      Effort: Pulmonary effort is normal.      Breath sounds: Normal breath sounds.   Abdominal:      General: Abdomen is flat. Bowel sounds are normal.      Palpations: Abdomen is soft.      Tenderness: There is no abdominal tenderness.   Musculoskeletal:      Cervical back: Neck supple.      Right lower leg: No edema.      Left lower leg: No edema.   Lymphadenopathy:      Cervical: No cervical adenopathy.   Skin:     General: Skin is warm and dry.   Neurological:      General: No focal deficit present.      Mental Status: He is alert and oriented to person, place, and time.      Sensory: Sensation is intact.      Motor: Motor function is intact.      Coordination: Coordination is intact.   Psychiatric:         Attention and Perception: Attention normal.         Mood and Affect: Mood normal.         Speech: Speech normal.         Behavior: Behavior normal.          POCT Results (if applicable):   Results for orders placed or performed in visit on 09/22/23   Lipid Panel With / Chol / HDL Ratio    Specimen: Blood   Result Value Ref Range    Triglycerides 151 (A) 0 - 150 mg/dL    Total Cholesterol 289 (A) 0 - 200 mg/dL    LDL-C 186     HDL Cholesterol 85 (A) 40 - 60 mg/dL       Procedures    Assessment / Plan      Assessment/Plan:   Diagnoses and all orders for this visit:    1. Well adult exam (Primary)   Patient did have a wellness exam performed today.  It did not reveal any abnormality.  He did well with respect to his anxiety/depressive symptoms.  Laboratory data obtained through the [facility] administration was reviewed.  We did discuss safety issues as well as anticipatory guidance.  We will continue to monitor and will treat accordingly.    2. Fatty liver   Patient does appear to have issues with fatty liver.  He has had a history of alcohol abuse but has not had anything to drink  since the summer.  We will continue to monitor liver function test and will possibly make arrangements for a ultrasound including an elastography.  We will continue to monitor and will [facility]    3. Gastroesophageal reflux disease without esophagitis   Patient is doing well with respect to reflux symptomatology present time.  If he does have issues with alarm symptoms we will refer for an EGD.    4. Elevated liver enzymes   Most likely this is due to fatty liver.  We will continue to monitor.  At some point he will need to have a hepatitis panel obtained to see if he needs any immunizations.  [facility] with elastography.    5. Mixed hyperlipidemia   Lipid profile was acceptable.  No changes are necessary at present time.  He does have an increase in his cardiovascular risk.  He should receive a lipid-lowering agent.  We will continue to pursue an will initiate if he is not taking 1 currently.    6. Elevated blood sugar   We are using a GLP-1 agonist.  We will monitor and will treat accordingly.  Hopefully his hemoglobin A1c will not worsen in the near future.  [facility] was in [facility].    7. Morbid obesity with BMI of 40.0-44.9, adult  Patient did start the initial trial of a GLP-1 agonist but has not lost any weight.  Patient states however that he had been on a cruise and has not had the shot for several weeks.  We will reinitiate and hopefully he will be able to receive it soon through the pharmacy.  We will send a request for 1 mg weekly even though we are starting at the initial dosing.  -     Semaglutide-Weight Management (Wegovy) 1 MG/0.5ML solution auto-injector; Inject 0.5 mL under the skin into the appropriate area as directed 1 (One) Time Per Week.  Dispense: 2 mL; Refill: 11    8. Immunization refused   Patient refuses vaccines at present time.  We have discussed the risk and  benefits of the flu vaccine as well as the pneumococcal vaccine.  We will continue to monitor and encourage during every visit.    9. Cigarette nicotine dependence in remission   Patient no longer smokes cigarettes but does continue to use electronic cigarettes.  We will [facility] to see if he has had a recent CT scan of his lungs.  If not we will make arrangements.       Healthcare Maintenance:  Counseling provided based on age appropriate USPSTF guidelines.       Michel Lopez II voices understanding and acceptance of this advice and will call back with any further questions or concerns. AVS with preventive healthcare tips printed for patient.     Follow Up:   Return in about 6 weeks (around 12/15/2023).    At [facility], we believe that sharing information builds trust and better relationships. You are receiving this note because you recently visited [facility]. It is possible you will see health information before a provider has talked with you about it. This kind of information can be easy to misunderstand. To help you fully understand what it means for your health, we urge you to discuss this note with your provider.    Jatinder Coulter MD  [facility]

## 2023-12-15 ENCOUNTER — LAB (OUTPATIENT)
Dept: FAMILY MEDICINE CLINIC | Facility: CLINIC | Age: 58
End: 2023-12-15
Payer: COMMERCIAL

## 2023-12-15 ENCOUNTER — OFFICE VISIT (OUTPATIENT)
Dept: FAMILY MEDICINE CLINIC | Facility: CLINIC | Age: 58
End: 2023-12-15
Payer: COMMERCIAL

## 2023-12-15 VITALS
RESPIRATION RATE: 18 BRPM | BODY MASS INDEX: 37.98 KG/M2 | WEIGHT: 242 LBS | DIASTOLIC BLOOD PRESSURE: 86 MMHG | HEART RATE: 103 BPM | TEMPERATURE: 98 F | HEIGHT: 67 IN | SYSTOLIC BLOOD PRESSURE: 126 MMHG | OXYGEN SATURATION: 94 %

## 2023-12-15 DIAGNOSIS — I10 PRIMARY HYPERTENSION: ICD-10-CM

## 2023-12-15 DIAGNOSIS — E78.2 MIXED HYPERLIPIDEMIA: ICD-10-CM

## 2023-12-15 DIAGNOSIS — E66.01 MORBID (SEVERE) OBESITY DUE TO EXCESS CALORIES: ICD-10-CM

## 2023-12-15 DIAGNOSIS — K21.9 GASTROESOPHAGEAL REFLUX DISEASE WITHOUT ESOPHAGITIS: ICD-10-CM

## 2023-12-15 DIAGNOSIS — Z28.21 IMMUNIZATION REFUSED: ICD-10-CM

## 2023-12-15 DIAGNOSIS — K76.0 FATTY LIVER: Primary | ICD-10-CM

## 2023-12-15 DIAGNOSIS — R73.9 ELEVATED BLOOD SUGAR: ICD-10-CM

## 2023-12-15 DIAGNOSIS — R53.83 OTHER FATIGUE: ICD-10-CM

## 2023-12-15 DIAGNOSIS — F17.211 CIGARETTE NICOTINE DEPENDENCE IN REMISSION: ICD-10-CM

## 2023-12-15 PROCEDURE — 83735 ASSAY OF MAGNESIUM: CPT | Performed by: FAMILY MEDICINE

## 2023-12-15 PROCEDURE — 80061 LIPID PANEL: CPT | Performed by: FAMILY MEDICINE

## 2023-12-15 PROCEDURE — 84100 ASSAY OF PHOSPHORUS: CPT | Performed by: FAMILY MEDICINE

## 2023-12-15 PROCEDURE — 84403 ASSAY OF TOTAL TESTOSTERONE: CPT | Performed by: FAMILY MEDICINE

## 2023-12-15 PROCEDURE — 83036 HEMOGLOBIN GLYCOSYLATED A1C: CPT | Performed by: FAMILY MEDICINE

## 2023-12-15 PROCEDURE — 85027 COMPLETE CBC AUTOMATED: CPT | Performed by: FAMILY MEDICINE

## 2023-12-15 PROCEDURE — 80053 COMPREHEN METABOLIC PANEL: CPT | Performed by: FAMILY MEDICINE

## 2023-12-15 NOTE — PROGRESS NOTES
Follow Up Office Visit      Date: 12/15/2023   Patient Name: Michel Lopez II  : 1965   MRN: [id number]     Chief Complaint:    Chief Complaint   Patient presents with    Follow-up    Hyperlipidemia    medication follow up        History of Present Illness: Michel Lopez II is a 58 y.o. male who is here today for follow-up.  Patient been doing relatively well since last being seen.  He is having limited issues with the GLP-1 agonist with little bit constipation and reflux symptomatology but wishes to continue with his current regiment.  He does continue take his other medication as prescribed without side effects.  He has continued with diminished appetite but otherwise has continue with his usual sleep and activity no issues are noted as he has continued to have no other cardiovascular, respiratory, gastrointestinal, urologic or neurologic complaints.  He does continue to see the pain specialist.    Subjective      Review of Systems:   Review of Systems   Constitutional:  Negative for activity change, appetite change and fatigue.   Respiratory:  Negative for cough and shortness of breath.    Cardiovascular:  Negative for chest pain, palpitations and leg swelling.   Gastrointestinal:  Positive for constipation and GERD. Negative for abdominal distention, abdominal pain, blood in stool, diarrhea, nausea, vomiting and indigestion.   Genitourinary:  Negative for dysuria, flank pain, frequency and urgency.   Musculoskeletal:  Positive for arthralgias, back pain, joint swelling and myalgias.   Neurological:  Negative for dizziness, tremors, seizures, syncope, weakness, light-headedness, headache and memory problem.   Psychiatric/Behavioral:  Negative for sleep disturbance, negative for hyperactivity and depressed mood.        I have reviewed the patients family history, social history, past medical history, past surgical history and have updated it as appropriate.     Medications:     Current  "Outpatient Medications:     calcipotriene (DOVONEX) 0.005 % cream, , Disp: , Rfl:     DULoxetine (CYMBALTA) 20 MG capsule, Take 2 capsules by mouth 2 (Two) Times a Day., Disp: , Rfl:     fluorouracil (EFUDEX) 5 % cream, , Disp: , Rfl:     Loratadine 10 MG capsule, Take 1 capsule by mouth Every Morning., Disp: , Rfl:     losartan (COZAAR) 25 MG tablet, Take 1 tablet by mouth Daily., Disp: , Rfl:     metoprolol tartrate (LOPRESSOR) 100 MG tablet, Take 1 tablet by mouth 2 (Two) Times a Day., Disp: , Rfl:     Multiple Vitamins-Minerals (Multivitamin Adult, Minerals,) tablet, Take 1 tablet by mouth Daily., Disp: , Rfl:     omeprazole (priLOSEC) 20 MG capsule, Take 1 capsule by mouth Daily., Disp: , Rfl:     Semaglutide-Weight Management (Wegovy) 1 MG/0.5ML solution auto-injector, Inject 0.5 mL under the skin into the appropriate area as directed 1 (One) Time Per Week., Disp: 2 mL, Rfl: 11    triamcinolone (KENALOG) 0.1 % ointment, , Disp: , Rfl:     Allergies:   Allergies   Allergen Reactions    Penicillins Hives    Ace Inhibitors Cough       Immunizations:   Immunization History   Administered Date(s) Administered    COVID-19 (MODERNA) 1st,2nd,3rd Dose Monovalent 11/19/2021    COVID-19 (PFIZER) Purple Cap Monovalent 03/23/2021, 04/13/2021    Shingrix 11/12/2019, 02/11/2020    Tdap 05/17/2017        Objective     Physical Exam: Please see above  Vital Signs:   Vitals:    12/15/23 1009   BP: 126/86   BP Location: Left arm   Patient Position: Sitting   Cuff Size: Adult   Pulse: 103   Resp: 18   Temp: 98 °F (36.7 °C)   TempSrc: Temporal   SpO2: 94%   Weight: 110 kg (242 lb)   Height: 170.2 cm (67.01\")     Body mass index is 37.89 kg/m².          Physical Exam  Vitals and nursing note reviewed.   Constitutional:       Appearance: Normal appearance.   HENT:      Head: Normocephalic and atraumatic.      Nose: Nose normal.      Mouth/Throat:      Pharynx: Oropharynx is clear.   Eyes:      Extraocular Movements: Extraocular " movements intact.      Pupils: Pupils are equal, round, and reactive to light.   Neck:      Thyroid: No thyroid mass or thyromegaly.      Trachea: Trachea normal.   Cardiovascular:      Rate and Rhythm: Normal rate and regular rhythm.      Pulses: Normal pulses. No decreased pulses.      Heart sounds: Normal heart sounds.   Pulmonary:      Effort: Pulmonary effort is normal.      Breath sounds: Normal breath sounds.   Abdominal:      General: Abdomen is flat. Bowel sounds are normal.      Palpations: Abdomen is soft.      Tenderness: There is no abdominal tenderness.   Musculoskeletal:      Cervical back: Neck supple.      Right lower leg: No edema.      Left lower leg: No edema.   Lymphadenopathy:      Cervical: No cervical adenopathy.   Skin:     General: Skin is warm and dry.   Neurological:      General: No focal deficit present.      Mental Status: He is alert and oriented to person, place, and time.      Sensory: Sensation is intact.      Motor: Motor function is intact.      Coordination: Coordination is intact.   Psychiatric:         Attention and Perception: Attention normal.         Mood and Affect: Mood normal.         Speech: Speech normal.         Behavior: Behavior normal.         Procedures    Results:   Labs:   Hemoglobin A1C   Date Value Ref Range Status   12/15/2023 5.50 4.80 - 5.60 % Final        POCT Results (if applicable):   Results for orders placed or performed in visit on 09/22/23   Lipid Panel With / Chol / HDL Ratio    Specimen: Blood   Result Value Ref Range    Triglycerides 151 (A) 0 - 150 mg/dL    Total Cholesterol 289 (A) 0 - 200 mg/dL    LDL-C 186     HDL Cholesterol 85 (A) 40 - 60 mg/dL       Imaging:   No valid procedures specified.     Measures:   Advanced Care Planning:   Patient does not have an advance directive, information provided.    Smoking Cessation:   Non-smoker. Stopped smoking 3 years ago after smoking for 6 years.    Assessment / Plan      Assessment/Plan:   Diagnoses  and all orders for this visit:    1. Fatty liver (Primary)   Patient has been modifying his diet and the GLP-1 agonist does appear to be helping with respect to his appetite and weight loss.  We will continue to monitor his liver function test and if he does continue to have increase in his LFTs we will obtain a FibroSure as well as a possible elastography testing.    2. Gastroesophageal reflux disease without esophagitis   Reflux symptoms are little bit worse since the initiation of the Neumega prescription reportedly not having any alarm symptoms.  If he does develop alarm symptoms referral for an EGD will be necessary.    3. Mixed hyperlipidemia  He is not taking any lipid-lowering agent at present time.  We will obtain a lipid profile and will calculate his cardiovascular risk.  We may need to initiate medications in the near future.  -     Lipid Panel; Future    4. Elevated blood sugar  Patient has had elevated hemoglobin A1c.  We will obtain a hemoglobin A1c with displacements  -     Hemoglobin A1c; Future    5. Immunization refused   Patient is a candidate for numerous vaccines.  He understands the risk and benefits of the influenza as well as the pneumococcal vaccines.  He does not wish to receive these vaccines currently.  We have discussed the risk and will encourage compliance with future appointments.    6. Cigarette nicotine dependence in remission   Patient does not smoke currently.  He does vape.  He has less than a 20-year history of smoking.  He is not a candidate for a low-dose CT scan at present time.    7. Morbid (severe) obesity due to excess calories   Patient does appear to be doing better with tolerance of the GLP-1 agonist.  We will continue to monitor and encourage increase in activity level and hopefully he will continue to lose 1 to 2 pounds per week.    8. Other fatigue  We will obtain a CBC as well as testosterone level.  If he has any abnormality further assessment treatment will be  necessary.  -     CBC (No Diff); Future  -     Testosterone; Future    9. Primary hypertension  Blood pressure is doing well at present time.  We will continue to monitor.  We will plan a renal function test and will make adjustments based on these findings.  -     Comprehensive Metabolic Panel; Future  -     Magnesium; Future  -     Phosphorus; Future        Follow Up:   Return in about 3 months (around 3/15/2024).      At [facility], we believe that sharing information builds trust and better relationships. You are receiving this note because you recently visited [facility]. It is possible you will see health information before a provider has talked with you about it. This kind of information can be easy to misunderstand. To help you fully understand what it means for your health, we urge you to discuss this note with your provider.    Jatinder Coulter MD  [facility]  Answers submitted by the patient for this visit:  Primary Reason for Visit (Submitted on 12/8/2023)  What is the primary reason for your visit?: Other  Other (Submitted on 12/8/2023)  Please describe your symptoms.: Follow-up visit.  Have you had these symptoms before?: No  How long have you been having these symptoms?: Greater than 2 weeks

## 2023-12-16 LAB
ALBUMIN SERPL-MCNC: 4.4 G/DL (ref 3.5–5.2)
ALBUMIN/GLOB SERPL: 1.3 G/DL
ALP SERPL-CCNC: 101 U/L (ref 39–117)
ALT SERPL W P-5'-P-CCNC: 49 U/L (ref 1–41)
ANION GAP SERPL CALCULATED.3IONS-SCNC: 12.1 MMOL/L (ref 5–15)
AST SERPL-CCNC: 42 U/L (ref 1–40)
BILIRUB SERPL-MCNC: 0.3 MG/DL (ref 0–1.2)
BUN SERPL-MCNC: 14 MG/DL (ref 6–20)
BUN/CREAT SERPL: 15.7 (ref 7–25)
CALCIUM SPEC-SCNC: 10 MG/DL (ref 8.6–10.5)
CHLORIDE SERPL-SCNC: 102 MMOL/L (ref 98–107)
CHOLEST SERPL-MCNC: 240 MG/DL (ref 0–200)
CO2 SERPL-SCNC: 22.9 MMOL/L (ref 22–29)
CREAT SERPL-MCNC: 0.89 MG/DL (ref 0.76–1.27)
DEPRECATED RDW RBC AUTO: 43.7 FL (ref 37–54)
EGFRCR SERPLBLD CKD-EPI 2021: 98.7 ML/MIN/1.73
ERYTHROCYTE [DISTWIDTH] IN BLOOD BY AUTOMATED COUNT: 13.1 % (ref 12.3–15.4)
GLOBULIN UR ELPH-MCNC: 3.3 GM/DL
GLUCOSE SERPL-MCNC: 82 MG/DL (ref 65–99)
HBA1C MFR BLD: 5.5 % (ref 4.8–5.6)
HCT VFR BLD AUTO: 43.5 % (ref 37.5–51)
HDLC SERPL-MCNC: 59 MG/DL (ref 40–60)
HGB BLD-MCNC: 15.4 G/DL (ref 13–17.7)
LDLC SERPL CALC-MCNC: 166 MG/DL (ref 0–100)
LDLC/HDLC SERPL: 2.77 {RATIO}
MAGNESIUM SERPL-MCNC: 2.2 MG/DL (ref 1.6–2.6)
MCH RBC QN AUTO: 32.3 PG (ref 26.6–33)
MCHC RBC AUTO-ENTMCNC: 35.4 G/DL (ref 31.5–35.7)
MCV RBC AUTO: 91.2 FL (ref 79–97)
PHOSPHATE SERPL-MCNC: 4.2 MG/DL (ref 2.5–4.5)
PLATELET # BLD AUTO: 256 10*3/MM3 (ref 140–450)
PMV BLD AUTO: 9.8 FL (ref 6–12)
POTASSIUM SERPL-SCNC: 4.1 MMOL/L (ref 3.5–5.2)
PROT SERPL-MCNC: 7.7 G/DL (ref 6–8.5)
RBC # BLD AUTO: 4.77 10*6/MM3 (ref 4.14–5.8)
SODIUM SERPL-SCNC: 137 MMOL/L (ref 136–145)
TESTOST SERPL-MCNC: 506 NG/DL (ref 193–740)
TRIGL SERPL-MCNC: 87 MG/DL (ref 0–150)
VLDLC SERPL-MCNC: 15 MG/DL (ref 5–40)
WBC NRBC COR # BLD AUTO: 6.3 10*3/MM3 (ref 3.4–10.8)

## 2023-12-18 ENCOUNTER — TELEPHONE (OUTPATIENT)
Dept: FAMILY MEDICINE CLINIC | Facility: CLINIC | Age: 58
End: 2023-12-18
Payer: COMMERCIAL

## 2023-12-18 NOTE — TELEPHONE ENCOUNTER
----- Message from Jatinder Coulter MD sent at 12/17/2023  7:58 PM EST -----  Patient's liver function test remains slightly elevated some improvement his cholesterol remains elevated.  He needs to start a cholesterol medication.  Will he?  Other labs are stable.  No other changes.  If he could increase his activity level to the liver which is way, this would improve his liver function test as well as his diabetes.

## 2024-03-14 ENCOUNTER — OFFICE VISIT (OUTPATIENT)
Dept: FAMILY MEDICINE CLINIC | Facility: CLINIC | Age: 59
End: 2024-03-14
Payer: COMMERCIAL

## 2024-03-14 VITALS
OXYGEN SATURATION: 95 % | WEIGHT: 236.2 LBS | HEIGHT: 67 IN | TEMPERATURE: 97.5 F | BODY MASS INDEX: 37.07 KG/M2 | SYSTOLIC BLOOD PRESSURE: 114 MMHG | RESPIRATION RATE: 16 BRPM | DIASTOLIC BLOOD PRESSURE: 88 MMHG | HEART RATE: 89 BPM

## 2024-03-14 DIAGNOSIS — K76.0 FATTY LIVER: ICD-10-CM

## 2024-03-14 DIAGNOSIS — E66.01 MORBID (SEVERE) OBESITY DUE TO EXCESS CALORIES: ICD-10-CM

## 2024-03-14 DIAGNOSIS — R73.09 ELEVATED HEMOGLOBIN A1C: ICD-10-CM

## 2024-03-14 DIAGNOSIS — Z28.21 IMMUNIZATION REFUSED: ICD-10-CM

## 2024-03-14 DIAGNOSIS — E78.2 MIXED HYPERLIPIDEMIA: ICD-10-CM

## 2024-03-14 DIAGNOSIS — K21.9 GASTROESOPHAGEAL REFLUX DISEASE WITHOUT ESOPHAGITIS: ICD-10-CM

## 2024-03-14 DIAGNOSIS — I10 PRIMARY HYPERTENSION: Primary | ICD-10-CM

## 2024-03-14 NOTE — PROGRESS NOTES
Follow Up Office Visit      Date: 2024   Patient Name: Michel Lopez II  : 1965   MRN: [id number]     Chief Complaint:    Chief Complaint   Patient presents with    Follow-up     3 month    Hypertension    Hyperlipidemia       History of Present Illness: Michel Lopez II is a 58 y.o. male who is here today for follow-up.  Patient is doing well since last being seen with good tolerance of the medications.  Patient has lost more weight since last being seen.  He is starting GLP-1 agonist without side effects.  He does continue to take his other medications without complaints.  He does continue to have diminished appetite.  His activity has been doing well as well as his sleep habits.  No other issues are noted currently as he denies any other cardiovascular, respiratory, gastrointestinal, urologic or neurologic complaints.    Subjective      Review of Systems:   Review of Systems   Constitutional:  Negative for activity change, appetite change and fatigue.   Respiratory:  Negative for cough, chest tightness, shortness of breath and wheezing.    Cardiovascular:  Negative for chest pain, palpitations and leg swelling.   Gastrointestinal:  Negative for abdominal distention, abdominal pain, blood in stool, constipation, diarrhea, nausea, vomiting, GERD and indigestion.   Genitourinary:  Negative for dysuria, flank pain, frequency, hematuria, nocturia and urgency.   Musculoskeletal:  Negative for arthralgias and myalgias.   Neurological:  Negative for dizziness, tremors, seizures, weakness, light-headedness, numbness, headache and memory problem.   Psychiatric/Behavioral:  Negative for sleep disturbance and depressed mood. The patient is not nervous/anxious.        I have reviewed the patients family history, social history, past medical history, past surgical history and have updated it as appropriate.     Medications:     Current Outpatient Medications:     DULoxetine (CYMBALTA) 20 MG capsule,  "Take 2 capsules by mouth 2 (Two) Times a Day., Disp: , Rfl:     Loratadine 10 MG capsule, Take 1 capsule by mouth Every Morning., Disp: , Rfl:     losartan (COZAAR) 25 MG tablet, Take 1 tablet by mouth Daily., Disp: , Rfl:     metoprolol tartrate (LOPRESSOR) 100 MG tablet, Take 1 tablet by mouth 2 (Two) Times a Day., Disp: , Rfl:     Multiple Vitamins-Minerals (Multivitamin Adult, Minerals,) tablet, Take 1 tablet by mouth Daily., Disp: , Rfl:     omeprazole (priLOSEC) 20 MG capsule, Take 1 capsule by mouth Daily., Disp: , Rfl:     Semaglutide-Weight Management (Wegovy) 1 MG/0.5ML solution auto-injector, Inject 0.5 mL under the skin into the appropriate area as directed 1 (One) Time Per Week., Disp: 2 mL, Rfl: 11    triamcinolone (KENALOG) 0.1 % ointment, , Disp: , Rfl:     Allergies:   Allergies   Allergen Reactions    Penicillins Hives    Ace Inhibitors Cough       Immunizations:   Immunization History   Administered Date(s) Administered    COVID-19 (MODERNA) 1st,2nd,3rd Dose Monovalent 11/19/2021    COVID-19 (PFIZER) Purple Cap Monovalent 03/23/2021, 04/13/2021    Shingrix 11/12/2019, 02/11/2020    Tdap 05/17/2017        Objective     Physical Exam: Please see above  Vital Signs:   Vitals:    03/14/24 1540   BP: 114/88   BP Location: Left arm   Patient Position: Sitting   Cuff Size: Adult   Pulse: 89   Resp: 16   Temp: 97.5 °F (36.4 °C)   TempSrc: Temporal   SpO2: 95%   Weight: 107 kg (236 lb 3.2 oz)   Height: 170.2 cm (67\")     Body mass index is 36.99 kg/m².          Physical Exam  Vitals and nursing note reviewed.   Constitutional:       Appearance: Normal appearance.   HENT:      Head: Normocephalic and atraumatic.      Nose: Nose normal.      Mouth/Throat:      Pharynx: Oropharynx is clear.   Eyes:      Extraocular Movements: Extraocular movements intact.      Pupils: Pupils are equal, round, and reactive to light.   Neck:      Thyroid: No thyroid mass or thyromegaly.      Trachea: Trachea normal. "   Cardiovascular:      Rate and Rhythm: Normal rate and regular rhythm.      Pulses: Normal pulses. No decreased pulses.      Heart sounds: Normal heart sounds.   Pulmonary:      Effort: Pulmonary effort is normal.      Breath sounds: Normal breath sounds.   Abdominal:      General: Abdomen is flat. Bowel sounds are normal.      Palpations: Abdomen is soft.      Tenderness: There is no abdominal tenderness.   Musculoskeletal:      Cervical back: Neck supple.      Right lower leg: No edema.      Left lower leg: No edema.   Lymphadenopathy:      Cervical: No cervical adenopathy.   Skin:     General: Skin is warm and dry.   Neurological:      General: No focal deficit present.      Mental Status: He is alert and oriented to person, place, and time.      Sensory: Sensation is intact.      Motor: Motor function is intact.      Coordination: Coordination is intact.   Psychiatric:         Attention and Perception: Attention normal.         Mood and Affect: Mood normal.         Speech: Speech normal.         Behavior: Behavior normal.         Procedures    Results:   Labs:   Hemoglobin A1C   Date Value Ref Range Status   12/15/2023 5.50 4.80 - 5.60 % Final        POCT Results (if applicable):   Results for orders placed or performed in visit on 12/15/23   CBC (No Diff)    Specimen: Arm, Left; Blood   Result Value Ref Range    WBC 6.30 3.40 - 10.80 10*3/mm3    RBC 4.77 4.14 - 5.80 10*6/mm3    Hemoglobin 15.4 13.0 - 17.7 g/dL    Hematocrit 43.5 37.5 - 51.0 %    MCV 91.2 79.0 - 97.0 fL    MCH 32.3 26.6 - 33.0 pg    MCHC 35.4 31.5 - 35.7 g/dL    RDW 13.1 12.3 - 15.4 %    RDW-SD 43.7 37.0 - 54.0 fl    MPV 9.8 6.0 - 12.0 fL    Platelets 256 140 - 450 10*3/mm3   Comprehensive Metabolic Panel    Specimen: Arm, Left; Blood   Result Value Ref Range    Glucose 82 65 - 99 mg/dL    BUN 14 6 - 20 mg/dL    Creatinine 0.89 0.76 - 1.27 mg/dL    Sodium 137 136 - 145 mmol/L    Potassium 4.1 3.5 - 5.2 mmol/L    Chloride 102 98 - 107 mmol/L     CO2 22.9 22.0 - 29.0 mmol/L    Calcium 10.0 8.6 - 10.5 mg/dL    Total Protein 7.7 6.0 - 8.5 g/dL    Albumin 4.4 3.5 - 5.2 g/dL    ALT (SGPT) 49 (H) 1 - 41 U/L    AST (SGOT) 42 (H) 1 - 40 U/L    Alkaline Phosphatase 101 39 - 117 U/L    Total Bilirubin 0.3 0.0 - 1.2 mg/dL    Globulin 3.3 gm/dL    A/G Ratio 1.3 g/dL    BUN/Creatinine Ratio 15.7 7.0 - 25.0    Anion Gap 12.1 5.0 - 15.0 mmol/L    eGFR 98.7 >60.0 mL/min/1.73   Hemoglobin A1c    Specimen: Arm, Left; Blood   Result Value Ref Range    Hemoglobin A1C 5.50 4.80 - 5.60 %   Magnesium    Specimen: Arm, Left; Blood   Result Value Ref Range    Magnesium 2.2 1.6 - 2.6 mg/dL   Phosphorus    Specimen: Arm, Left; Blood   Result Value Ref Range    Phosphorus 4.2 2.5 - 4.5 mg/dL   Testosterone    Specimen: Arm, Left; Blood   Result Value Ref Range    Testosterone, Total 506.00 193.00 - 740.00 ng/dL   Lipid Panel    Specimen: Arm, Left; Blood   Result Value Ref Range    Total Cholesterol 240 (H) 0 - 200 mg/dL    Triglycerides 87 0 - 150 mg/dL    HDL Cholesterol 59 40 - 60 mg/dL    LDL Cholesterol  166 (H) 0 - 100 mg/dL    VLDL Cholesterol 15 5 - 40 mg/dL    LDL/HDL Ratio 2.77        Imaging:   No valid procedures specified.     Measures:   Advanced Care Planning:   Patient does not have an advance directive, information provided.    Smoking Cessation:   Non-smoker.    Assessment / Plan      Assessment/Plan:   Diagnoses and all orders for this visit:    1. Primary hypertension (Primary)  Patient's blood pressure is doing well at present time.  As he continues to lose weight it may be necessary for us to adjust his medications.  We will continue to monitor his levels and will adjust accordingly.  We will obtain a CMP to assess renal function.  -     Cancel: Comprehensive Metabolic Panel; Future  -     Comprehensive Metabolic Panel; Future    2. Mixed hyperlipidemia  We will obtain a lipid profile.  We will adjust to keep his LDL less than 70.  We we will check his liver  function test.  He does have a history of fatty liver and hopefully with his weight loss there will be improvement.  -     Cancel: Lipid Panel; Future  -     Lipid Panel; Future    3. Fatty liver  We will obtain liver function test as well as a FibroSure test.  We will make adjustments based on these findings.  We have encouraged him to continue to modify his diet and to increase his activity level to 300 minutes of aerobic exercise weekly.  -     Cancel: Comprehensive Metabolic Panel; Future  -     Cancel: HERNANDEZ Fibrosure; Future  -     Comprehensive Metabolic Panel; Future  -     HERNANDEZ Fibrosure; Future    4. Gastroesophageal reflux disease without esophagitis   Patient's reflux is doing well at present time.  He is tolerating his medications without complaint.  We will continue to monitor and treat accordingly.    5. Morbid (severe) obesity due to excess calories   Patient is losing weight.  He is on the GLP-1 agonist.  We have also encouraged increase in his activity level.  We will continue to monitor and will adjust accordingly.    6. Immunization refused   Patient is due for the flu shot and pneumonia shot.  He has declined these at present time.  He understands the risk of not receiving these vaccines.  We will encourage compliance with future appointments.    7. Elevated hemoglobin A1c  Hopefully with his weight loss his hemoglobin A1c has improved.  We will obtain hemoglobin A1c and will adjust medications to keep his level less than 7%.  -     Cancel: Hemoglobin A1c; Future  -     Hemoglobin A1c; Future        Follow Up:   Return in about 3 months (around 6/14/2024).      At [facility], we believe that sharing information builds trust and better relationships. You are receiving this note because you recently visited [facility]. It is possible you will see health information before a provider has talked with you about it. This kind of information can be easy to misunderstand. To help you fully  understand what it means for your health, we urge you to discuss this note with your provider.    Jatinder Coulter MD  [facility]  Answers submitted by the patient for this visit:  Primary Reason for Visit (Submitted on 3/7/2024)  What is the primary reason for your visit?: Other  Other (Submitted on 3/7/2024)  Please describe your symptoms.: Wegovy/weight management follow-up  Have you had these symptoms before?: Yes  How long have you been having these symptoms?: Greater than 2 weeks

## 2024-03-19 ENCOUNTER — LAB (OUTPATIENT)
Dept: LAB | Facility: HOSPITAL | Age: 59
End: 2024-03-19
Payer: COMMERCIAL

## 2024-03-19 DIAGNOSIS — K76.0 FATTY LIVER: ICD-10-CM

## 2024-03-19 DIAGNOSIS — R73.09 ELEVATED HEMOGLOBIN A1C: ICD-10-CM

## 2024-03-19 DIAGNOSIS — I10 PRIMARY HYPERTENSION: ICD-10-CM

## 2024-03-19 DIAGNOSIS — E78.2 MIXED HYPERLIPIDEMIA: ICD-10-CM

## 2024-03-19 DIAGNOSIS — R74.8 ELEVATED LIVER ENZYMES: ICD-10-CM

## 2024-03-19 LAB
ALBUMIN SERPL-MCNC: 4.1 G/DL (ref 3.5–5.2)
ALBUMIN/GLOB SERPL: 1.2 G/DL
ALP SERPL-CCNC: 90 U/L (ref 39–117)
ALT SERPL W P-5'-P-CCNC: 31 U/L (ref 1–41)
ANION GAP SERPL CALCULATED.3IONS-SCNC: 13 MMOL/L (ref 5–15)
AST SERPL-CCNC: 26 U/L (ref 1–40)
BILIRUB SERPL-MCNC: 0.4 MG/DL (ref 0–1.2)
BUN SERPL-MCNC: 15 MG/DL (ref 6–20)
BUN/CREAT SERPL: 19 (ref 7–25)
CALCIUM SPEC-SCNC: 9.8 MG/DL (ref 8.6–10.5)
CHLORIDE SERPL-SCNC: 100 MMOL/L (ref 98–107)
CHOLEST SERPL-MCNC: 234 MG/DL (ref 0–200)
CO2 SERPL-SCNC: 23 MMOL/L (ref 22–29)
CREAT SERPL-MCNC: 0.79 MG/DL (ref 0.76–1.27)
EGFRCR SERPLBLD CKD-EPI 2021: 102.3 ML/MIN/1.73
GLOBULIN UR ELPH-MCNC: 3.3 GM/DL
GLUCOSE SERPL-MCNC: 86 MG/DL (ref 65–99)
HDLC SERPL-MCNC: 48 MG/DL (ref 40–60)
LDLC SERPL CALC-MCNC: 161 MG/DL (ref 0–100)
LDLC/HDLC SERPL: 3.3 {RATIO}
POTASSIUM SERPL-SCNC: 4.2 MMOL/L (ref 3.5–5.2)
PROT SERPL-MCNC: 7.4 G/DL (ref 6–8.5)
SODIUM SERPL-SCNC: 136 MMOL/L (ref 136–145)
TRIGL SERPL-MCNC: 138 MG/DL (ref 0–150)
VLDLC SERPL-MCNC: 25 MG/DL (ref 5–40)

## 2024-03-19 PROCEDURE — 36415 COLL VENOUS BLD VENIPUNCTURE: CPT

## 2024-03-19 PROCEDURE — 80053 COMPREHEN METABOLIC PANEL: CPT

## 2024-03-19 PROCEDURE — 83883 ASSAY NEPHELOMETRY NOT SPEC: CPT

## 2024-03-19 PROCEDURE — 82465 ASSAY BLD/SERUM CHOLESTEROL: CPT

## 2024-03-19 PROCEDURE — 83010 ASSAY OF HAPTOGLOBIN QUANT: CPT

## 2024-03-19 PROCEDURE — 82172 ASSAY OF APOLIPOPROTEIN: CPT

## 2024-03-19 PROCEDURE — 80061 LIPID PANEL: CPT

## 2024-03-19 PROCEDURE — 84478 ASSAY OF TRIGLYCERIDES: CPT

## 2024-03-19 PROCEDURE — 83036 HEMOGLOBIN GLYCOSYLATED A1C: CPT

## 2024-03-19 PROCEDURE — 82977 ASSAY OF GGT: CPT

## 2024-03-19 PROCEDURE — 82247 BILIRUBIN TOTAL: CPT

## 2024-03-19 PROCEDURE — 82947 ASSAY GLUCOSE BLOOD QUANT: CPT

## 2024-03-20 LAB — HBA1C MFR BLD: 5.3 % (ref 4.8–5.6)

## 2024-03-22 ENCOUNTER — TELEPHONE (OUTPATIENT)
Dept: FAMILY MEDICINE CLINIC | Facility: CLINIC | Age: 59
End: 2024-03-22

## 2024-03-22 LAB
A2 MACROGLOB SERPL-MCNC: 408 MG/DL (ref 110–276)
ALT SERPL W P-5'-P-CCNC: 39 IU/L (ref 0–55)
APO A-I SERPL-MCNC: 131 MG/DL (ref 101–178)
AST SERPL W P-5'-P-CCNC: 27 IU/L (ref 0–40)
BILIRUB SERPL-MCNC: 0.4 MG/DL (ref 0–1.2)
CHOLEST SERPL-MCNC: 231 MG/DL (ref 100–199)
FIBROSIS SCORING:: ABNORMAL
FIBROSIS STAGE SERPL QL: ABNORMAL
GGT SERPL-CCNC: 68 IU/L (ref 0–65)
GLUCOSE SERPL-MCNC: 82 MG/DL (ref 70–99)
HAPTOGLOB SERPL-MCNC: 118 MG/DL (ref 29–370)
LABORATORY COMMENT REPORT: ABNORMAL
LIVER FIBR SCORE SERPL CALC.FIBROSURE: 0.7 (ref 0–0.21)
LIVER STEATOSIS GRADE SERPL QL: ABNORMAL
LIVER STEATOSIS SCORE SERPL: 0.42 (ref 0–0.4)
NASH GRADE SERPL QL: ABNORMAL
NASH INTERPRETATION SERPL-IMP: ABNORMAL
NASH SCORE SERPL: 0.61 (ref 0–0.25)
NASH SCORING: ABNORMAL
STEATOSIS SCORING: ABNORMAL
TEST PERFORMANCE INFO SPEC: ABNORMAL
TEST PERFORMANCE INFO SPEC: ABNORMAL
TRIGL SERPL-MCNC: 160 MG/DL (ref 0–149)

## 2024-03-22 NOTE — TELEPHONE ENCOUNTER
Caller: Michel Lopez II    Relationship: Self    Best call back number: [phone]     What is the best time to reach you: ANYTIME    Who are you requesting to speak with (clinical staff, provider,  specific staff member): CLINICAL    Do you know the name of the person who called: SUMMER    What was the call regarding: LAB RESULTS    Is it okay if the provider responds through MyChart:

## 2024-03-29 DIAGNOSIS — E66.01 MORBID OBESITY WITH BMI OF 40.0-44.9, ADULT: ICD-10-CM

## 2024-03-29 RX ORDER — SEMAGLUTIDE 1 MG/.5ML
1 INJECTION, SOLUTION SUBCUTANEOUS WEEKLY
Qty: 2 ML | Refills: 11 | Status: SHIPPED | OUTPATIENT
Start: 2024-03-29

## 2024-04-15 ENCOUNTER — PATIENT MESSAGE (OUTPATIENT)
Dept: FAMILY MEDICINE CLINIC | Facility: CLINIC | Age: 59
End: 2024-04-15
Payer: COMMERCIAL

## 2024-04-15 DIAGNOSIS — E66.01 MORBID OBESITY WITH BMI OF 40.0-44.9, ADULT: Primary | ICD-10-CM

## 2024-04-16 RX ORDER — SEMAGLUTIDE 1.7 MG/.75ML
1.7 INJECTION, SOLUTION SUBCUTANEOUS WEEKLY
Qty: 3 ML | Refills: 11 | Status: SHIPPED | OUTPATIENT
Start: 2024-04-16

## 2024-06-13 ENCOUNTER — OFFICE VISIT (OUTPATIENT)
Dept: FAMILY MEDICINE CLINIC | Facility: CLINIC | Age: 59
End: 2024-06-13
Payer: COMMERCIAL

## 2024-06-13 VITALS
RESPIRATION RATE: 18 BRPM | BODY MASS INDEX: 36.54 KG/M2 | DIASTOLIC BLOOD PRESSURE: 78 MMHG | TEMPERATURE: 98.7 F | SYSTOLIC BLOOD PRESSURE: 108 MMHG | HEIGHT: 67 IN | OXYGEN SATURATION: 95 % | WEIGHT: 232.8 LBS | HEART RATE: 100 BPM

## 2024-06-13 DIAGNOSIS — K21.9 GASTROESOPHAGEAL REFLUX DISEASE WITHOUT ESOPHAGITIS: ICD-10-CM

## 2024-06-13 DIAGNOSIS — E66.01 MORBID OBESITY WITH BMI OF 40.0-44.9, ADULT: ICD-10-CM

## 2024-06-13 DIAGNOSIS — M54.41 CHRONIC BILATERAL LOW BACK PAIN WITH BILATERAL SCIATICA: ICD-10-CM

## 2024-06-13 DIAGNOSIS — M54.42 CHRONIC BILATERAL LOW BACK PAIN WITH BILATERAL SCIATICA: ICD-10-CM

## 2024-06-13 DIAGNOSIS — R73.09 ELEVATED HEMOGLOBIN A1C: ICD-10-CM

## 2024-06-13 DIAGNOSIS — Z28.21 IMMUNIZATION REFUSED: ICD-10-CM

## 2024-06-13 DIAGNOSIS — G89.29 CHRONIC BILATERAL LOW BACK PAIN WITH BILATERAL SCIATICA: ICD-10-CM

## 2024-06-13 DIAGNOSIS — K74.00 HEPATIC FIBROSIS: ICD-10-CM

## 2024-06-13 DIAGNOSIS — I10 PRIMARY HYPERTENSION: Primary | ICD-10-CM

## 2024-06-13 DIAGNOSIS — E78.2 MIXED HYPERLIPIDEMIA: ICD-10-CM

## 2024-06-13 DIAGNOSIS — K75.81 METABOLIC DYSFUNCTION-ASSOCIATED STEATOHEPATITIS (MASH): ICD-10-CM

## 2024-06-13 PROBLEM — M47.812 CERVICAL SPONDYLOSIS: Status: ACTIVE | Noted: 2024-02-16

## 2024-06-13 PROCEDURE — 99215 OFFICE O/P EST HI 40 MIN: CPT | Performed by: FAMILY MEDICINE

## 2024-06-13 RX ORDER — ROSUVASTATIN AND EZETIMIBE 10; 10.4 MG/1; MG/1
TABLET ORAL
COMMUNITY
Start: 2024-05-24 | End: 2024-06-13 | Stop reason: SDUPTHER

## 2024-06-13 RX ORDER — DULOXETIN HYDROCHLORIDE 30 MG/1
CAPSULE, DELAYED RELEASE ORAL
COMMUNITY
Start: 2024-05-23 | End: 2024-06-13

## 2024-06-13 RX ORDER — SEMAGLUTIDE 2.4 MG/.75ML
2.4 INJECTION, SOLUTION SUBCUTANEOUS WEEKLY
Qty: 3 ML | Refills: 11 | Status: SHIPPED | OUTPATIENT
Start: 2024-06-13

## 2024-06-13 RX ORDER — PRAVASTATIN SODIUM 40 MG
TABLET ORAL
COMMUNITY
Start: 2024-05-24

## 2024-06-13 RX ORDER — EZETIMIBE 10 MG/1
10 TABLET ORAL DAILY
Start: 2024-06-13

## 2024-06-13 RX ORDER — DULOXETIN HYDROCHLORIDE 60 MG/1
60 CAPSULE, DELAYED RELEASE ORAL DAILY
Start: 2024-06-13

## 2024-06-13 RX ORDER — HYDROCODONE BITARTRATE AND ACETAMINOPHEN 7.5; 325 MG/1; MG/1
TABLET ORAL
COMMUNITY
Start: 2024-03-11

## 2024-06-14 ENCOUNTER — LAB (OUTPATIENT)
Dept: LAB | Facility: HOSPITAL | Age: 59
End: 2024-06-14
Payer: COMMERCIAL

## 2024-06-14 DIAGNOSIS — R73.09 ELEVATED HEMOGLOBIN A1C: ICD-10-CM

## 2024-06-14 DIAGNOSIS — K74.00 HEPATIC FIBROSIS: ICD-10-CM

## 2024-06-14 DIAGNOSIS — E78.2 MIXED HYPERLIPIDEMIA: ICD-10-CM

## 2024-06-14 DIAGNOSIS — I10 PRIMARY HYPERTENSION: ICD-10-CM

## 2024-06-14 LAB
ALBUMIN SERPL-MCNC: 4.5 G/DL (ref 3.5–5.2)
ALBUMIN/GLOB SERPL: 1.3 G/DL
ALP SERPL-CCNC: 104 U/L (ref 39–117)
ALPHA1 GLOB MFR UR ELPH: 101 MG/DL (ref 90–200)
ALT SERPL W P-5'-P-CCNC: 29 U/L (ref 1–41)
ANION GAP SERPL CALCULATED.3IONS-SCNC: 13.4 MMOL/L (ref 5–15)
AST SERPL-CCNC: 31 U/L (ref 1–40)
BILIRUB SERPL-MCNC: 0.4 MG/DL (ref 0–1.2)
BUN SERPL-MCNC: 17 MG/DL (ref 6–20)
BUN/CREAT SERPL: 17.5 (ref 7–25)
CALCIUM SPEC-SCNC: 9.9 MG/DL (ref 8.6–10.5)
CERULOPLASMIN SERPL-MCNC: 21 MG/DL (ref 16–31)
CHLORIDE SERPL-SCNC: 99 MMOL/L (ref 98–107)
CHOLEST SERPL-MCNC: 114 MG/DL (ref 0–200)
CO2 SERPL-SCNC: 23.6 MMOL/L (ref 22–29)
CREAT SERPL-MCNC: 0.97 MG/DL (ref 0.76–1.27)
DEPRECATED RDW RBC AUTO: 42.6 FL (ref 37–54)
EGFRCR SERPLBLD CKD-EPI 2021: 89.9 ML/MIN/1.73
ERYTHROCYTE [DISTWIDTH] IN BLOOD BY AUTOMATED COUNT: 12.4 % (ref 12.3–15.4)
FERRITIN SERPL-MCNC: 44.9 NG/ML (ref 30–400)
GLOBULIN UR ELPH-MCNC: 3.5 GM/DL
GLUCOSE SERPL-MCNC: 87 MG/DL (ref 65–99)
HBV SURFACE AG SERPL QL IA: NORMAL
HCT VFR BLD AUTO: 46.2 % (ref 37.5–51)
HDLC SERPL-MCNC: 58 MG/DL (ref 40–60)
HGB BLD-MCNC: 15.2 G/DL (ref 13–17.7)
IRON 24H UR-MRATE: 114 MCG/DL (ref 59–158)
IRON SATN MFR SERPL: 24 % (ref 20–50)
LDLC SERPL CALC-MCNC: 34 MG/DL (ref 0–100)
LDLC/HDLC SERPL: 0.54 {RATIO}
MCH RBC QN AUTO: 30.9 PG (ref 26.6–33)
MCHC RBC AUTO-ENTMCNC: 32.9 G/DL (ref 31.5–35.7)
MCV RBC AUTO: 93.9 FL (ref 79–97)
PLATELET # BLD AUTO: 260 10*3/MM3 (ref 140–450)
PMV BLD AUTO: 9.9 FL (ref 6–12)
POTASSIUM SERPL-SCNC: 4.9 MMOL/L (ref 3.5–5.2)
PROT SERPL-MCNC: 8 G/DL (ref 6–8.5)
RBC # BLD AUTO: 4.92 10*6/MM3 (ref 4.14–5.8)
SODIUM SERPL-SCNC: 136 MMOL/L (ref 136–145)
TIBC SERPL-MCNC: 481 MCG/DL (ref 298–536)
TRANSFERRIN SERPL-MCNC: 323 MG/DL (ref 200–360)
TRIGL SERPL-MCNC: 124 MG/DL (ref 0–150)
VLDLC SERPL-MCNC: 22 MG/DL (ref 5–40)
WBC NRBC COR # BLD AUTO: 7.13 10*3/MM3 (ref 3.4–10.8)

## 2024-06-14 PROCEDURE — 86704 HEP B CORE ANTIBODY TOTAL: CPT

## 2024-06-14 PROCEDURE — 82728 ASSAY OF FERRITIN: CPT

## 2024-06-14 PROCEDURE — 86258 DGP ANTIBODY EACH IG CLASS: CPT

## 2024-06-14 PROCEDURE — 82390 ASSAY OF CERULOPLASMIN: CPT

## 2024-06-14 PROCEDURE — 86706 HEP B SURFACE ANTIBODY: CPT

## 2024-06-14 PROCEDURE — 87340 HEPATITIS B SURFACE AG IA: CPT

## 2024-06-14 PROCEDURE — 85027 COMPLETE CBC AUTOMATED: CPT

## 2024-06-14 PROCEDURE — 83036 HEMOGLOBIN GLYCOSYLATED A1C: CPT

## 2024-06-14 PROCEDURE — 83540 ASSAY OF IRON: CPT

## 2024-06-14 PROCEDURE — 86708 HEPATITIS A ANTIBODY: CPT

## 2024-06-14 PROCEDURE — 85007 BL SMEAR W/DIFF WBC COUNT: CPT

## 2024-06-14 PROCEDURE — 80053 COMPREHEN METABOLIC PANEL: CPT

## 2024-06-14 PROCEDURE — 84466 ASSAY OF TRANSFERRIN: CPT

## 2024-06-14 PROCEDURE — 36415 COLL VENOUS BLD VENIPUNCTURE: CPT

## 2024-06-14 PROCEDURE — 86364 TISS TRNSGLTMNASE EA IG CLAS: CPT

## 2024-06-14 PROCEDURE — 80061 LIPID PANEL: CPT

## 2024-06-14 PROCEDURE — 82103 ALPHA-1-ANTITRYPSIN TOTAL: CPT

## 2024-06-14 PROCEDURE — 82784 ASSAY IGA/IGD/IGG/IGM EACH: CPT

## 2024-06-14 NOTE — PROGRESS NOTES
Follow Up Office Visit      Date: 2024   Patient Name: Michel Lopez II  : 1965   MRN: [id number]     Chief Complaint:    Chief Complaint   Patient presents with    Follow-up     3 month        History of Present Illness: Michel Lopez II is a 58 y.o. male who is here today for follow-up.  [facility] repeat Diaz FibroSure test that still suggested that he had underlying hepatic fibrosis.  They did obtain an ultrasound of his liver but did not do an elastography.  The ultrasound did confirm that he had liver parenchymal disease.  There has not been any schedule appointment with the hepatologist.  Patient has otherwise continue to take his medications as prescribed without any complaints.  He has recently increased his duloxetine to 60 mg as suggested by pain management and he is currently undergoing evaluation for possible rhizotomy.  He does continue to take his medications as prescribed.  He is trying a low-fat/low-cholesterol diet as well as increase in his aerobic exercise.  Patient has also been tolerating the GLP-1 agonist but feels that he has reached a plateau.  He denies any change in his usual activity, appetite and sleep.  Patient has not had any other cardiovascular, respiratory, gastrointestinal, urologic or neurologic complaints.    Subjective      Review of Systems:   Review of Systems   Constitutional:  Negative for activity change, appetite change and fatigue.   Respiratory:  Negative for cough, chest tightness, shortness of breath and wheezing.    Cardiovascular:  Negative for chest pain, palpitations and leg swelling.   Gastrointestinal:  Negative for abdominal distention, abdominal pain, blood in stool, constipation, diarrhea, nausea, vomiting, GERD and indigestion.   Genitourinary:  Negative for dysuria, flank pain, frequency and urgency.   Musculoskeletal:  Positive for arthralgias, back pain and myalgias. Negative for gait  problem and joint swelling.   Neurological:  Negative for dizziness, tremors, seizures, syncope, weakness, light-headedness, numbness, headache and memory problem.   Psychiatric/Behavioral:  Negative for sleep disturbance and depressed mood. The patient is not nervous/anxious.        I have reviewed the patients family history, social history, past medical history, past surgical history and have updated it as appropriate.     Medications:     Current Outpatient Medications:     HYDROcodone-acetaminophen (NORCO) 7.5-325 MG per tablet, Take 1 tablet every 6 hours by oral route for 3 days., Disp: , Rfl:     Loratadine 10 MG capsule, Take 1 capsule by mouth Every Morning., Disp: , Rfl:     losartan (COZAAR) 25 MG tablet, Take 1 tablet by mouth Daily., Disp: , Rfl:     Metoprolol-HCTZ -12.5 MG tablet sustained-release 24 hour, , Disp: , Rfl:     Multiple Vitamins-Minerals (Multivitamin Adult, Minerals,) tablet, Take 1 tablet by mouth Daily., Disp: , Rfl:     omeprazole (priLOSEC) 20 MG capsule, Take 1 capsule by mouth Daily., Disp: , Rfl:     pravastatin (PRAVACHOL) 40 MG tablet, , Disp: , Rfl:     DULoxetine (CYMBALTA) 60 MG capsule, Take 1 capsule by mouth Daily., Disp: , Rfl:     ezetimibe (Zetia) 10 MG tablet, Take 1 tablet by mouth Daily., Disp: , Rfl:     Semaglutide-Weight Management (Wegovy) 2.4 MG/0.75ML solution auto-injector, Inject 2.4 mg under the skin into the appropriate area as directed 1 (One) Time Per Week., Disp: 3 mL, Rfl: 11    Allergies:   Allergies   Allergen Reactions    Penicillins Hives    Ace Inhibitors Cough       Immunizations:   Immunization History   Administered Date(s) Administered    COVID-19 (MODERNA) 1st,2nd,3rd Dose Monovalent 11/19/2021    COVID-19 (PFIZER) Purple Cap Monovalent 03/23/2021, 04/13/2021    Shingrix 11/12/2019, 02/11/2020    Tdap 05/17/2017        Objective     Physical Exam: Please see above  Vital Signs:   Vitals:    06/13/24 1652   BP: 108/78   BP Location:  "Left arm   Patient Position: Sitting   Cuff Size: Adult   Pulse: 100   Resp: 18   Temp: 98.7 °F (37.1 °C)   TempSrc: Temporal   SpO2: 95%   Weight: 106 kg (232 lb 12.8 oz)   Height: 170.2 cm (67.01\")   PainSc:   6   PainLoc: Back     Body mass index is 36.45 kg/m².          Physical Exam  Vitals and nursing note reviewed.   Constitutional:       Appearance: Normal appearance.   HENT:      Head: Normocephalic and atraumatic.      Nose: Nose normal.      Mouth/Throat:      Pharynx: Oropharynx is clear.   Eyes:      Extraocular Movements: Extraocular movements intact.      Pupils: Pupils are equal, round, and reactive to light.   Neck:      Thyroid: No thyroid mass or thyromegaly.      Trachea: Trachea normal.   Cardiovascular:      Rate and Rhythm: Normal rate and regular rhythm.      Pulses: Normal pulses. No decreased pulses.      Heart sounds: Normal heart sounds.   Pulmonary:      Effort: Pulmonary effort is normal.      Breath sounds: Normal breath sounds.   Abdominal:      General: Abdomen is flat. Bowel sounds are normal.      Palpations: Abdomen is soft.      Tenderness: There is no abdominal tenderness.   Musculoskeletal:      Cervical back: Neck supple.      Right lower leg: No edema.      Left lower leg: No edema.   Lymphadenopathy:      Cervical: No cervical adenopathy.   Skin:     General: Skin is warm and dry.   Neurological:      General: No focal deficit present.      Mental Status: He is alert and oriented to person, place, and time.      Sensory: Sensation is intact.      Motor: Motor function is intact.      Coordination: Coordination is intact.   Psychiatric:         Attention and Perception: Attention normal.         Mood and Affect: Mood normal.         Speech: Speech normal.         Behavior: Behavior normal.         Procedures    Results:   Labs:   Hemoglobin A1C   Date Value Ref Range Status   03/19/2024 5.30 4.80 - 5.60 % Final        POCT Results (if applicable):   Results for orders placed " or performed in visit on 03/19/24   Comprehensive Metabolic Panel    Specimen: Blood   Result Value Ref Range    Glucose 86 65 - 99 mg/dL    BUN 15 6 - 20 mg/dL    Creatinine 0.79 0.76 - 1.27 mg/dL    Sodium 136 136 - 145 mmol/L    Potassium 4.2 3.5 - 5.2 mmol/L    Chloride 100 98 - 107 mmol/L    CO2 23.0 22.0 - 29.0 mmol/L    Calcium 9.8 8.6 - 10.5 mg/dL    Total Protein 7.4 6.0 - 8.5 g/dL    Albumin 4.1 3.5 - 5.2 g/dL    ALT (SGPT) 31 1 - 41 U/L    AST (SGOT) 26 1 - 40 U/L    Alkaline Phosphatase 90 39 - 117 U/L    Total Bilirubin 0.4 0.0 - 1.2 mg/dL    Globulin 3.3 gm/dL    A/G Ratio 1.2 g/dL    BUN/Creatinine Ratio 19.0 7.0 - 25.0    Anion Gap 13.0 5.0 - 15.0 mmol/L    eGFR 102.3 >60.0 mL/min/1.73   Hemoglobin A1c    Specimen: Blood   Result Value Ref Range    Hemoglobin A1C 5.30 4.80 - 5.60 %   Lipid Panel    Specimen: Blood   Result Value Ref Range    Total Cholesterol 234 (H) 0 - 200 mg/dL    Triglycerides 138 0 - 150 mg/dL    HDL Cholesterol 48 40 - 60 mg/dL    LDL Cholesterol  161 (H) 0 - 100 mg/dL    VLDL Cholesterol 25 5 - 40 mg/dL    LDL/HDL Ratio 3.30    HERNANDEZ Fibrosure    Specimen: Blood   Result Value Ref Range    Fibrosis Score 0.70 (H) 0.00 - 0.21    Fibrosis Stage Comment     Steatosis Score (Reference) 0.42 (H) 0.00 - 0.40    Steatosis Grade (Reference) Comment     HERNANDEZ Score (Reference) 0.61 (H) 0.00 - 0.25    Hernandez Grade (Reference) Comment     Methodology: Comment     Alpha 2-Macroglobulins, Qn 408 (H) 110 - 276 mg/dL    Haptoglobin 118 29 - 370 mg/dL    Apolipoprotein A-1 131 101 - 178 mg/dL    Total Bilirubin 0.4 0.0 - 1.2 mg/dL    GGT 68 (H) 0 - 65 IU/L    ALT (SGPT) 39 0 - 55 IU/L    AST (SGOT) P5P (Reference) 27 0 - 40 IU/L    Cholesterol, Total (Reference) 231 (H) 100 - 199 mg/dL    Glucose, Serum (Reference) 82 70 - 99 mg/dL    Triglycerides 160 (H) 0 - 149 mg/dL    Interpretations: (Reference) Comment     Fibrosis Scoring: Comment     Steatosis Scoring Comment     HERNANDEZ Scoring Comment      Limitations: Comment     Comment Comment        Imaging:   No valid procedures specified.     Measures:   Advanced Care Planning:   Patient does not have an advance directive, information provided.    Smoking Cessation:   Non-smoker.    Assessment / Plan      Assessment/Plan:   Diagnoses and all orders for this visit:    1. Primary hypertension (Primary)  Patient's blood pressure has been doing relatively well since last being seen.  He is tolerating his medication at present time.  We will continue with his current regiment and will monitor.  We will repeat a CMP to assess renal function and will make adjustments based on these findings.  -     Comprehensive Metabolic Panel; Future    2. Mixed hyperlipidemia  Patient will have a lipid profile obtained.  [facility].  We will adjust to keep his LDL less than 70.  -     Lipid Panel; Future  -     ezetimibe (Zetia) 10 MG tablet; Take 1 tablet by mouth Daily.    3. Metabolic dysfunction-associated steatohepatitis (MASH)  Patient did have a FibroSure test obtained that did reveal that he had fatty liver as well has probable hepatic fibrosis.  We have suggested that he obtain an elastography of his liver.  [facility] proceeded [facility] that did show liver parenchymal disease.  There has not been any follow-up arranged at present time.  Since he does probably have some hepatic fibrosis, we will make arrangements for him to see a gastroenterologist.  Patient may need to have a liver biopsy and may be a candidate of rezdiffra.  He has been encouraged to avoid carbohydrates and fats and try to increase his aerobic exercise to 300 minutes weekly.  We will also increase his Wegovy to 2.4 mg weekly.  -     Ambulatory Referral to Gastroenterology  -     Ambulatory Referral to Gastroenterology    4. Hepatic fibrosis  We did review the [facility].  They had obtained a PT/PTT but did  not obtain much other labs.  We will pursue with further workup to see if he is immune to hepatitis B.  We will also check his hepatitis A status as he is a candidate.  We have discussed options and will not start vitamin D at present time due to his increase in hemoglobin A1c.  We will refer to a hepatologist for evaluation and treatment.  Patient understands to avoid Tylenol/alcohol as well as other products that may cause increased liver function test.  -     Hepatitis A Virus (HAV) Antibody, Total With Reflex to IgM; Future  -     Hepatitis B Surface Antibody; Future  -     Alpha - 1 - Antitrypsin; Future  -     Ceruloplasmin; Future  -     Hepatitis B surface antigen; Future  -     Hepatitis B core antibody, total; Future  -     Celiac Panel Reflex To Titer; Future  -     CBC With Manual Differential; Future  -     Iron Profile; Future  -     Ferritin; Future  -     Ambulatory Referral to Gastroenterology  -     Ambulatory Referral to Gastroenterology    5. Elevated hemoglobin A1c  Patient does have a history of elevated hemoglobin A1c.  He is currently taking a GLP-1 agonist but is not doing as well as we had hoped with respect to his weight loss.  We will recheck his hemoglobin A1c and will increase the GLP-1 agonist and will monitor closely.  -     Hemoglobin A1c; Future    6. Gastroesophageal reflux disease without esophagitis   Reflux has not been a problem at present time.  We will continue with his current regimen and monitor.  If he has other problems or plans further assessment treatment will be necessary.    7. Immunization refused   Patient is a candidate for several vaccines including the pneumonia shot.  We have encouraged him to receive the pneumonia shot and to consider the hepatitis A as well as hepatitis B vaccine if he is not immune.  He does not wish to have any vaccines at present time.  He understands the risk of not receiving these vaccines.  We will encourage compliance in the  future.    8. Morbid obesity with BMI of 40.0-44.9, adult  Patient has tolerated the GLP-1 agonist and has plateaued with respect to his weight.  We have encouraged him to increase his aerobic exercise.  We will increase the Wegovy from 1.7 mg to 2.4 mg weekly.  We will continue to monitor closely and will treat accordingly.  He understands that treatment of fatty liver focuses on weight loss.  -     Semaglutide-Weight Management (Wegovy) 2.4 MG/0.75ML solution auto-injector; Inject 2.4 mg under the skin into the appropriate area as directed 1 (One) Time Per Week.  Dispense: 3 mL; Refill: 11    9. Chronic bilateral low back pain with bilateral sciatica  Patient does continue to have difficulty with his low back.  He has been followed by pain management and did have increase in his Cymbalta.  He is undergoing evaluation for possible rhizotomy.  -     DULoxetine (CYMBALTA) 60 MG capsule; Take 1 capsule by mouth Daily.      Over 40 minutes was spent with the patient discussing the pathophysiology of his disease and the importance of keeping follow-up with a hepatologist.  We went into detail with respect to treatment options as well as the importance of weight loss that would hopefully prevent future complications.    Follow Up:   Return in about 4 weeks (around 7/11/2024).      At [facility], we believe that sharing information builds trust and better relationships. You are receiving this note because you recently visited [facility]. It is possible you will see health information before a provider has talked with you about it. This kind of information can be easy to misunderstand. To help you fully understand what it means for your health, we urge you to discuss this note with your provider.    Jatinder Coulter MD  [facility]  Answers submitted by the patient for this visit:  Primary Reason for Visit (Submitted on 6/11/2024)  What is the primary reason for your visit?: Other  Other (Submitted on  6/11/2024)  Please describe your symptoms.: Wegovy follow up.  Have you had these symptoms before?: No  How long have you been having these symptoms?: Greater than 2 weeks  Please describe any probable cause for these symptoms. : Im fat.

## 2024-06-15 LAB
BASOPHILS # BLD MANUAL: 0 10*3/MM3 (ref 0–0.2)
BASOPHILS NFR BLD MANUAL: 0 % (ref 0–1.5)
EOSINOPHIL # BLD MANUAL: 0.29 10*3/MM3 (ref 0–0.4)
EOSINOPHIL NFR BLD MANUAL: 4.1 % (ref 0.3–6.2)
HBA1C MFR BLD: 5.3 % (ref 4.8–5.6)
HBV SURFACE AB SER RIA-ACNC: REACTIVE
LYMPHOCYTES # BLD MANUAL: 2.77 10*3/MM3 (ref 0.7–3.1)
LYMPHOCYTES NFR BLD MANUAL: 6.1 % (ref 5–12)
MONOCYTES # BLD: 0.43 10*3/MM3 (ref 0.1–0.9)
NEUTROPHILS # BLD AUTO: 3.64 10*3/MM3 (ref 1.7–7)
NEUTROPHILS NFR BLD MANUAL: 51 % (ref 42.7–76)
PLAT MORPH BLD: NORMAL
POIKILOCYTOSIS BLD QL SMEAR: NORMAL
VARIANT LYMPHS NFR BLD MANUAL: 38.8 % (ref 19.6–45.3)
WBC MORPH BLD: NORMAL

## 2024-06-16 LAB
HAV AB SER QL IA: NEGATIVE
HBV CORE AB SERPL QL IA: NEGATIVE

## 2024-06-17 ENCOUNTER — TELEPHONE (OUTPATIENT)
Dept: FAMILY MEDICINE CLINIC | Facility: CLINIC | Age: 59
End: 2024-06-17
Payer: COMMERCIAL

## 2024-06-17 NOTE — TELEPHONE ENCOUNTER
----- Message from Jatinder Coulter sent at 6/16/2024  4:46 PM EDT -----  Labs and liver function test have shown improvement.  It does appear that he is immune to hepatitis B.  However, it does not appear that he is immune to hepatitis A and needs to be vaccinated.  His alpha-1 antitrypsin levels are appropriate.  Other labs thus far appear to be appropriate.  No changes at present time.  We will still need to have him see the hepatologist.

## 2024-06-18 LAB
GLIADIN PEPTIDE IGA SER-ACNC: 6 UNITS (ref 0–19)
IGA SERPL-MCNC: 508 MG/DL (ref 90–386)
TTG IGA SER-ACNC: <2 U/ML (ref 0–3)

## 2024-06-19 NOTE — PROGRESS NOTES
Contacted patient to speak about blood work. Patient verbalized good understanding and appreciation. No questions or concerns.

## 2024-07-11 ENCOUNTER — OFFICE VISIT (OUTPATIENT)
Dept: FAMILY MEDICINE CLINIC | Facility: CLINIC | Age: 59
End: 2024-07-11
Payer: COMMERCIAL

## 2024-07-11 VITALS
OXYGEN SATURATION: 94 % | WEIGHT: 232 LBS | DIASTOLIC BLOOD PRESSURE: 62 MMHG | SYSTOLIC BLOOD PRESSURE: 104 MMHG | TEMPERATURE: 97 F | BODY MASS INDEX: 36.41 KG/M2 | HEART RATE: 87 BPM | RESPIRATION RATE: 18 BRPM | HEIGHT: 67 IN

## 2024-07-11 DIAGNOSIS — K75.81 METABOLIC DYSFUNCTION-ASSOCIATED STEATOHEPATITIS (MASH): Primary | ICD-10-CM

## 2024-07-11 DIAGNOSIS — R73.09 ELEVATED HEMOGLOBIN A1C: ICD-10-CM

## 2024-07-11 DIAGNOSIS — I10 PRIMARY HYPERTENSION: ICD-10-CM

## 2024-07-11 DIAGNOSIS — K74.00 HEPATIC FIBROSIS: ICD-10-CM

## 2024-07-11 DIAGNOSIS — Z28.21 IMMUNIZATION REFUSED: ICD-10-CM

## 2024-07-11 PROCEDURE — 99214 OFFICE O/P EST MOD 30 MIN: CPT | Performed by: FAMILY MEDICINE

## 2024-07-11 RX ORDER — VALACYCLOVIR HYDROCHLORIDE 1 G/1
TABLET, FILM COATED ORAL
COMMUNITY
Start: 2024-06-16

## 2024-07-13 NOTE — PROGRESS NOTES
Follow Up Office Visit      Date: 2024   Patient Name: Michel Lopez II  : 1965   MRN: [id number]     Chief Complaint:    Chief Complaint   Patient presents with    Follow-up     4 weeks       History of Present Illness: Michel Lopez II is a 58 y.o. male who is here today for follow-up.  Patient had recently had the Diaz FibroSure test that does suggest some underlying abnormality including some hepatic fibrosis.  [facility] did suggest that he had liver parenchymal disease.  We have discussed the possibility of seeing a gastroenterologist as well as pursuing other modalities.  [facility] was [facility] to see if he could have this performed but unfortunately has not been able to proceed further.  Patient has been modifying his diet and exercise regimen.  He no longer is drinking alcohol.  He does continue to take the Wegovy as prescribed without complaints.  He has continue with his usual activity, appetite sleep.  Patient denies any other cardiovascular, respiratory, gastrointestinal, urologic or neurologic complaints.    Subjective      Review of Systems:   Review of Systems   Constitutional:  Negative for activity change, appetite change and fatigue.   Respiratory:  Negative for cough and shortness of breath.    Cardiovascular:  Negative for chest pain, palpitations and leg swelling.   Gastrointestinal:  Negative for abdominal distention, abdominal pain, blood in stool, constipation, diarrhea, nausea, vomiting, GERD and indigestion.   Genitourinary:  Negative for dysuria, flank pain, frequency and urgency.   Musculoskeletal:  Negative for arthralgias and myalgias.   Neurological:  Negative for dizziness, tremors, seizures, syncope, weakness, light-headedness, numbness, headache and memory problem.   Psychiatric/Behavioral:  Negative for sleep disturbance and depressed mood. The patient is not nervous/anxious.        I have reviewed  the patients family history, social history, past medical history, past surgical history and have updated it as appropriate.     Medications:     Current Outpatient Medications:     DULoxetine (CYMBALTA) 60 MG capsule, Take 1 capsule by mouth Daily. (Patient taking differently: Take 90 mg by mouth Daily.), Disp: , Rfl:     ezetimibe (Zetia) 10 MG tablet, Take 1 tablet by mouth Daily., Disp: , Rfl:     HYDROcodone-acetaminophen (NORCO) 7.5-325 MG per tablet, Take 1 tablet every 6 hours by oral route for 3 days., Disp: , Rfl:     Loratadine 10 MG capsule, Take 1 capsule by mouth Every Morning., Disp: , Rfl:     losartan (COZAAR) 25 MG tablet, Take 1 tablet by mouth Daily., Disp: , Rfl:     Metoprolol-HCTZ -12.5 MG tablet sustained-release 24 hour, , Disp: , Rfl:     Multiple Vitamins-Minerals (Multivitamin Adult, Minerals,) tablet, Take 1 tablet by mouth Daily., Disp: , Rfl:     omeprazole (priLOSEC) 20 MG capsule, Take 1 capsule by mouth Daily., Disp: , Rfl:     pravastatin (PRAVACHOL) 40 MG tablet, , Disp: , Rfl:     Semaglutide-Weight Management (Wegovy) 2.4 MG/0.75ML solution auto-injector, Inject 2.4 mg under the skin into the appropriate area as directed 1 (One) Time Per Week., Disp: 3 mL, Rfl: 11    valACYclovir (VALTREX) 1000 MG tablet, , Disp: , Rfl:     Allergies:   Allergies   Allergen Reactions    Penicillins Hives    Ace Inhibitors Cough       Immunizations:   Immunization History   Administered Date(s) Administered    COVID-19 (MODERNA) 1st,2nd,3rd Dose Monovalent 11/19/2021    COVID-19 (PFIZER) Purple Cap Monovalent 03/23/2021, 04/13/2021    Shingrix 11/12/2019, 02/11/2020    Tdap 05/17/2017        Objective     Physical Exam: Please see above  Vital Signs:   Vitals:    07/11/24 1638   BP: 104/62   BP Location: Right arm   Patient Position: Sitting   Cuff Size: Large Adult   Pulse: 87   Resp: 18   Temp: 97 °F (36.1 °C)   TempSrc: Temporal   SpO2: 94%   Weight: 105 kg (232 lb)   Height: 170.2 cm  "(67.01\")   PainSc:   7   PainLoc: Back     Body mass index is 36.33 kg/m².          Physical Exam  Vitals and nursing note reviewed.   Constitutional:       Appearance: Normal appearance.   HENT:      Head: Normocephalic and atraumatic.      Nose: Nose normal.      Mouth/Throat:      Pharynx: Oropharynx is clear.   Eyes:      Extraocular Movements: Extraocular movements intact.      Pupils: Pupils are equal, round, and reactive to light.   Neck:      Thyroid: No thyroid mass or thyromegaly.      Trachea: Trachea normal.   Cardiovascular:      Rate and Rhythm: Normal rate and regular rhythm.      Pulses: Normal pulses. No decreased pulses.      Heart sounds: Normal heart sounds.   Pulmonary:      Effort: Pulmonary effort is normal.      Breath sounds: Normal breath sounds.   Abdominal:      General: Abdomen is flat. Bowel sounds are normal.      Palpations: Abdomen is soft.      Tenderness: There is no abdominal tenderness.   Musculoskeletal:      Cervical back: Neck supple.      Right lower leg: No edema.      Left lower leg: No edema.   Lymphadenopathy:      Cervical: No cervical adenopathy.   Skin:     General: Skin is warm and dry.   Neurological:      General: No focal deficit present.      Mental Status: He is alert and oriented to person, place, and time.      Sensory: Sensation is intact.      Motor: Motor function is intact.      Coordination: Coordination is intact.   Psychiatric:         Attention and Perception: Attention normal.         Mood and Affect: Mood normal.         Speech: Speech normal.         Behavior: Behavior normal.         Procedures    Results:   Labs:   Hemoglobin A1C   Date Value Ref Range Status   06/14/2024 5.30 4.80 - 5.60 % Final        POCT Results (if applicable):   Results for orders placed or performed in visit on 06/14/24   Hepatitis A Virus (HAV) Antibody, Total With Reflex to IgM    Specimen: Blood   Result Value Ref Range    Hep A Total Ab Negative Negative   Hepatitis B " Surface Antibody    Specimen: Blood   Result Value Ref Range    Hep B S Ab Reactive    Alpha - 1 - Antitrypsin    Specimen: Blood   Result Value Ref Range    ALPHA -1 ANTITRYPSIN 101 90 - 200 mg/dL   Ceruloplasmin    Specimen: Blood   Result Value Ref Range    Ceruloplasmin 21 16 - 31 mg/dL   Hepatitis B surface antigen    Specimen: Blood   Result Value Ref Range    Hepatitis B Surface Ag Non-Reactive Non-Reactive   Hepatitis B core antibody, total    Specimen: Blood   Result Value Ref Range    Hep B Core Total Ab Negative Negative   Comprehensive Metabolic Panel    Specimen: Blood   Result Value Ref Range    Glucose 87 65 - 99 mg/dL    BUN 17 6 - 20 mg/dL    Creatinine 0.97 0.76 - 1.27 mg/dL    Sodium 136 136 - 145 mmol/L    Potassium 4.9 3.5 - 5.2 mmol/L    Chloride 99 98 - 107 mmol/L    CO2 23.6 22.0 - 29.0 mmol/L    Calcium 9.9 8.6 - 10.5 mg/dL    Total Protein 8.0 6.0 - 8.5 g/dL    Albumin 4.5 3.5 - 5.2 g/dL    ALT (SGPT) 29 1 - 41 U/L    AST (SGOT) 31 1 - 40 U/L    Alkaline Phosphatase 104 39 - 117 U/L    Total Bilirubin 0.4 0.0 - 1.2 mg/dL    Globulin 3.5 gm/dL    A/G Ratio 1.3 g/dL    BUN/Creatinine Ratio 17.5 7.0 - 25.0    Anion Gap 13.4 5.0 - 15.0 mmol/L    eGFR 89.9 >60.0 mL/min/1.73   Hemoglobin A1c    Specimen: Blood   Result Value Ref Range    Hemoglobin A1C 5.30 4.80 - 5.60 %   Lipid Panel    Specimen: Blood   Result Value Ref Range    Total Cholesterol 114 0 - 200 mg/dL    Triglycerides 124 0 - 150 mg/dL    HDL Cholesterol 58 40 - 60 mg/dL    LDL Cholesterol  34 0 - 100 mg/dL    VLDL Cholesterol 22 5 - 40 mg/dL    LDL/HDL Ratio 0.54    Celiac Panel Reflex To Titer    Specimen: Blood   Result Value Ref Range    Gliadin Deamidated Peptide Ab, IgA 6 0 - 19 units    Tissue Transglutaminase IgA <2 0 - 3 U/mL    IgA 508 (H) 90 - 386 mg/dL   Iron Profile    Specimen: Blood   Result Value Ref Range    Iron 114 59 - 158 mcg/dL    Iron Saturation (TSAT) 24 20 - 50 %    Transferrin 323 200 - 360 mg/dL    TIBC  481 298 - 536 mcg/dL   Ferritin    Specimen: Blood   Result Value Ref Range    Ferritin 44.90 30.00 - 400.00 ng/mL   CBC Auto Differential    Specimen: Blood   Result Value Ref Range    WBC 7.13 3.40 - 10.80 10*3/mm3    RBC 4.92 4.14 - 5.80 10*6/mm3    Hemoglobin 15.2 13.0 - 17.7 g/dL    Hematocrit 46.2 37.5 - 51.0 %    MCV 93.9 79.0 - 97.0 fL    MCH 30.9 26.6 - 33.0 pg    MCHC 32.9 31.5 - 35.7 g/dL    RDW 12.4 12.3 - 15.4 %    RDW-SD 42.6 37.0 - 54.0 fl    MPV 9.9 6.0 - 12.0 fL    Platelets 260 140 - 450 10*3/mm3   Manual Differential    Specimen: Blood   Result Value Ref Range    Neutrophil % 51.0 42.7 - 76.0 %    Lymphocyte % 38.8 19.6 - 45.3 %    Monocyte % 6.1 5.0 - 12.0 %    Eosinophil % 4.1 0.3 - 6.2 %    Basophil % 0.0 0.0 - 1.5 %    Neutrophils Absolute 3.64 1.70 - 7.00 10*3/mm3    Lymphocytes Absolute 2.77 0.70 - 3.10 10*3/mm3    Monocytes Absolute 0.43 0.10 - 0.90 10*3/mm3    Eosinophils Absolute 0.29 0.00 - 0.40 10*3/mm3    Basophils Absolute 0.00 0.00 - 0.20 10*3/mm3    Poikilocytes Mod/2+ None Seen    WBC Morphology Normal Normal    Platelet Morphology Normal Normal       Imaging:   No valid procedures specified.     Measures:   Advanced Care Planning:   Patient does not have an advance directive, information provided.    Smoking Cessation:   Non-smoker.    Assessment / Plan      Assessment/Plan:   Diagnoses and all orders for this visit:    1. Metabolic dysfunction-associated steatohepatitis (MASH) (Primary)  [facility] been [facility].  We have discussed that the Diaz FibroSure test may be overestimating the amount of liver involvement he has but I would prefer obtaining an elastography to further assess.  I am quite concerned that he did have liver parenchymal disease noted [facility].  We will obtain a elastography test as well referral to a gastroenterologist.  He will continue with his GLP-1 agonist, low-fat/low-cholesterol as well as 300  minutes of aerobic exercise weekly.  -     US Liver; Future  -     Ambulatory Referral to Gastroenterology    2. Hepatic fibrosis  Diaz FibroSure test suggest that he has hepatic fibrosis.  We will continue with further workup and evaluate accordingly.  -     US Liver; Future  -     Ambulatory Referral to Gastroenterology    3. Elevated hemoglobin A1c   Patient is modifying his diet as well as his GLP-1 agonist.  We will continue with encouragement of diet exercise.  We will continue to monitor and will adjust accordingly to keep his hemoglobin A1c less than 7%.    4. Primary hypertension   Patient's blood pressure is running little bit on the low side.  Most likely this is secondary to his weight loss.  We have discussed options for him to continue with his current regimen but will monitor and it may be necessary for us to cut back even further on the losartan.  If he becomes symptomatic he will let us know.    5. Immunization refused   Patient is a candidate for several vaccines.  He does not wish to proceed with a pneumonia shot at present time.  He understands the risk of not receiving this vaccine.  We will encourage compliance with future appointments.      Follow Up:   Return in about 3 months (around 10/16/2024).      At [facility], we believe that sharing information builds trust and better relationships. You are receiving this note because you recently visited [facility]. It is possible you will see health information before a provider has talked with you about it. This kind of information can be easy to misunderstand. To help you fully understand what it means for your health, we urge you to discuss this note with your provider.    Jatinder Coulter MD  [facility]  Answers submitted by the patient for this visit:  Primary Reason for Visit (Submitted on 7/5/2024)  What is the primary reason for your visit?: Other  Other (Submitted on 7/5/2024)  Please describe your symptoms.: Follow up  visit.  Have you had these symptoms before?: No  How long have you been having these symptoms?: 1-2 weeks

## 2024-07-25 ENCOUNTER — PATIENT MESSAGE (OUTPATIENT)
Dept: FAMILY MEDICINE CLINIC | Facility: CLINIC | Age: 59
End: 2024-07-25
Payer: COMMERCIAL

## 2024-07-25 DIAGNOSIS — R68.82 DECREASED SEX DRIVE: Primary | ICD-10-CM

## 2024-07-30 ENCOUNTER — LAB (OUTPATIENT)
Dept: LAB | Facility: HOSPITAL | Age: 59
End: 2024-07-30
Payer: COMMERCIAL

## 2024-07-30 DIAGNOSIS — R68.82 DECREASED SEX DRIVE: ICD-10-CM

## 2024-07-30 LAB — TESTOST SERPL-MCNC: 480 NG/DL (ref 193–740)

## 2024-07-30 PROCEDURE — 84403 ASSAY OF TOTAL TESTOSTERONE: CPT

## 2024-07-30 PROCEDURE — 84402 ASSAY OF FREE TESTOSTERONE: CPT

## 2024-07-30 PROCEDURE — 36415 COLL VENOUS BLD VENIPUNCTURE: CPT

## 2024-08-06 LAB
TESTOST FREE MFR SERPL: 0.8 %
TESTOST FREE SERPL-MCNC: 31 PG/ML
TESTOST SERPL-MCNC: 383 NG/DL

## 2024-08-08 ENCOUNTER — OFFICE VISIT (OUTPATIENT)
Dept: FAMILY MEDICINE CLINIC | Facility: CLINIC | Age: 59
End: 2024-08-08
Payer: COMMERCIAL

## 2024-08-08 VITALS
WEIGHT: 230.6 LBS | HEIGHT: 67 IN | HEART RATE: 85 BPM | SYSTOLIC BLOOD PRESSURE: 110 MMHG | BODY MASS INDEX: 36.19 KG/M2 | OXYGEN SATURATION: 97 % | TEMPERATURE: 98.7 F | RESPIRATION RATE: 16 BRPM | DIASTOLIC BLOOD PRESSURE: 78 MMHG

## 2024-08-08 DIAGNOSIS — K74.00 HEPATIC FIBROSIS: ICD-10-CM

## 2024-08-08 DIAGNOSIS — E78.2 MIXED HYPERLIPIDEMIA: ICD-10-CM

## 2024-08-08 DIAGNOSIS — Z28.21 IMMUNIZATION REFUSED: ICD-10-CM

## 2024-08-08 DIAGNOSIS — K75.81 METABOLIC DYSFUNCTION-ASSOCIATED STEATOHEPATITIS (MASH): ICD-10-CM

## 2024-08-08 DIAGNOSIS — I10 PRIMARY HYPERTENSION: ICD-10-CM

## 2024-08-08 DIAGNOSIS — E29.1 HYPOGONADISM IN MALE: Primary | ICD-10-CM

## 2024-08-08 DIAGNOSIS — R73.09 ELEVATED HEMOGLOBIN A1C: ICD-10-CM

## 2024-08-08 PROCEDURE — 99214 OFFICE O/P EST MOD 30 MIN: CPT | Performed by: FAMILY MEDICINE

## 2024-08-08 RX ORDER — METOPROLOL SUCCINATE 100 MG/1
TABLET, EXTENDED RELEASE ORAL
COMMUNITY
Start: 2024-08-01

## 2024-08-09 RX ORDER — TESTOSTERONE 25 MG/2.5G
50 GEL TRANSDERMAL DAILY
Qty: 2.5 G | Refills: 5 | Status: SHIPPED | OUTPATIENT
Start: 2024-08-09

## 2024-08-09 NOTE — PROGRESS NOTES
Follow Up Office Visit      Date: 2024   Patient Name: Michel Lopez II  : 1965   MRN: [id number]     Chief Complaint:    Chief Complaint   Patient presents with    Follow-up     Discuss lab results.    Hyperlipidemia       History of Present Illness: Michel Lopez II is a 59 y.o. male who is here today for for follow-up.  Recent laboratory data that suggest that he has primary hypogonadism.  Patient has had difficulty with sex drive, fatigue, endurance excetra and is interested in supplementation.  He is having a workup with respect to his liver disease at present time.  It does appear that he is tolerating his Wegovy.  He is doing well with his other medications without any other complaints.  Patient does not have a family history of prostate cancer.  He has never had a blood clot in the past.  Patient appears to be doing otherwise well.  They have continue with their medications without any side effects.  They have not had any changes in their usual activity, appetite and sleep.  Patient denies any other cardiovascular, respiratory, gastrointestinal, urologic or neurologic complaints.    Subjective      Review of Systems:   Review of Systems   Constitutional:  Negative for activity change, appetite change and fatigue.   Respiratory:  Negative for cough, chest tightness, shortness of breath and wheezing.    Cardiovascular:  Negative for chest pain, palpitations and leg swelling.   Gastrointestinal:  Negative for abdominal distention, abdominal pain, blood in stool, constipation, diarrhea, nausea, vomiting, GERD and indigestion.   Genitourinary:  Negative for difficulty urinating, dysuria, flank pain, frequency, hematuria and urgency.   Musculoskeletal:  Negative for arthralgias, back pain, gait problem, joint swelling and myalgias.   Neurological:  Negative for dizziness, tremors, seizures, syncope, weakness, light-headedness, numbness, headache and memory problem.    Psychiatric/Behavioral:  Negative for sleep disturbance and depressed mood. The patient is not nervous/anxious.        I have reviewed the patients family history, social history, past medical history, past surgical history and have updated it as appropriate.     Medications:     Current Outpatient Medications:     DULoxetine (CYMBALTA) 60 MG capsule, Take 1 capsule by mouth Daily. (Patient taking differently: Take 90 mg by mouth Daily.), Disp: , Rfl:     ezetimibe (Zetia) 10 MG tablet, Take 1 tablet by mouth Daily., Disp: , Rfl:     HYDROcodone-acetaminophen (NORCO) 7.5-325 MG per tablet, Take 1 tablet every 6 hours by oral route for 3 days., Disp: , Rfl:     Loratadine 10 MG capsule, Take 1 capsule by mouth Every Morning., Disp: , Rfl:     losartan (COZAAR) 25 MG tablet, Take 1 tablet by mouth Daily., Disp: , Rfl:     metoprolol succinate XL (TOPROL-XL) 100 MG 24 hr tablet, , Disp: , Rfl:     Metoprolol-HCTZ -12.5 MG tablet sustained-release 24 hour, , Disp: , Rfl:     Multiple Vitamins-Minerals (Multivitamin Adult, Minerals,) tablet, Take 1 tablet by mouth Daily., Disp: , Rfl:     omeprazole (priLOSEC) 20 MG capsule, Take 1 capsule by mouth Daily., Disp: , Rfl:     pravastatin (PRAVACHOL) 40 MG tablet, , Disp: , Rfl:     Semaglutide-Weight Management (Wegovy) 2.4 MG/0.75ML solution auto-injector, Inject 2.4 mg under the skin into the appropriate area as directed 1 (One) Time Per Week., Disp: 3 mL, Rfl: 11    valACYclovir (VALTREX) 1000 MG tablet, , Disp: , Rfl:     testosterone (ANDROGEL) 25 MG/2.5GM (1%) gel gel, Place 50 mg on the skin as directed by provider Daily. To be applied to shoulders or upper arms daily., Disp: 2.5 g, Rfl: 5    Allergies:   Allergies   Allergen Reactions    Penicillins Hives    Ace Inhibitors Cough       Immunizations:   Immunization History   Administered Date(s) Administered    COVID-19 (MODERNA) 1st,2nd,3rd Dose Monovalent 11/19/2021    COVID-19 (PFIZER) Purple Cap  "Monovalent 03/23/2021, 04/13/2021    Shingrix 11/12/2019, 02/11/2020    Tdap 05/17/2017        Objective     Physical Exam: Please see above  Vital Signs:   Vitals:    08/08/24 1604   BP: 110/78   BP Location: Left arm   Patient Position: Sitting   Cuff Size: Large Adult   Pulse: 85   Resp: 16   Temp: 98.7 °F (37.1 °C)   TempSrc: Temporal   SpO2: 97%   Weight: 105 kg (230 lb 9.6 oz)   Height: 170.2 cm (67\")     Body mass index is 36.12 kg/m².          Physical Exam  Vitals and nursing note reviewed.   Constitutional:       Appearance: Normal appearance.   HENT:      Head: Normocephalic and atraumatic.      Nose: Nose normal.      Mouth/Throat:      Pharynx: Oropharynx is clear.   Eyes:      Extraocular Movements: Extraocular movements intact.      Pupils: Pupils are equal, round, and reactive to light.   Neck:      Thyroid: No thyroid mass or thyromegaly.      Trachea: Trachea normal.   Cardiovascular:      Rate and Rhythm: Normal rate and regular rhythm.      Pulses: Normal pulses. No decreased pulses.      Heart sounds: Normal heart sounds.   Pulmonary:      Effort: Pulmonary effort is normal.      Breath sounds: Normal breath sounds.   Abdominal:      General: Abdomen is flat. Bowel sounds are normal.      Palpations: Abdomen is soft.      Tenderness: There is no abdominal tenderness.   Musculoskeletal:      Cervical back: Neck supple.      Right lower leg: No edema.      Left lower leg: No edema.   Lymphadenopathy:      Cervical: No cervical adenopathy.   Skin:     General: Skin is warm and dry.   Neurological:      General: No focal deficit present.      Mental Status: He is alert and oriented to person, place, and time.      Sensory: Sensation is intact.      Motor: Motor function is intact.      Coordination: Coordination is intact.   Psychiatric:         Attention and Perception: Attention normal.         Mood and Affect: Mood normal.         Speech: Speech normal.         Behavior: Behavior normal. "         Procedures    Results:   Labs:   Hemoglobin A1C   Date Value Ref Range Status   06/14/2024 5.30 4.80 - 5.60 % Final        POCT Results (if applicable):   Results for orders placed or performed in visit on 07/30/24   Testosterone Free MS / Dialysis    Specimen: Blood   Result Value Ref Range    Testosterone, Total 383 ng/dL    Testosterone, Free % 0.8 %    Testosterone, Free 31 (L) pg/mL   Testosterone    Specimen: Blood   Result Value Ref Range    Testosterone, Total 480.00 193.00 - 740.00 ng/dL       Imaging:   No valid procedures specified.     Measures:   Advanced Care Planning:   Patient does not have an advance directive, information provided.    Smoking Cessation:   Non-smoker    Assessment / Plan      Assessment/Plan:   Diagnoses and all orders for this visit:    1. Hypogonadism in male (Primary)  Patient does have low testosterone and is having symptoms that would indicate that he would receive benefit with treatment.  He understands the risk and benefits of testosterone including increased risk for prostate cancer, blood clots, polycythemia excetra.  We will initiate at a 50 mg dose daily and will reassess in 3 months time.  If he has any difficulties prior to his scheduled follow-up he will contact us.  -     testosterone (ANDROGEL) 25 MG/2.5GM (1%) gel gel; Place 50 mg on the skin as directed by provider Daily. To be applied to shoulders or upper arms daily.  Dispense: 2.5 g; Refill: 5    2. Metabolic dysfunction-associated steatohepatitis (MASH)   [facility].  Hopefully he does not have any underlying hepatic fibrosis.  He will continue with his current low carbohydrate/low-fat diet as well as 300 minutes of aerobic exercise weekly.  We have also encouraged him to continue with the GLP-1 agonist.  He understands that weight loss is key.    3. Hepatic fibrosis    4. Elevated hemoglobin A1c   Patient does have a history of elevated  hemoglobin A1c.  They have continue with a low carbohydrate/low-fat diet and is attempting 300 minutes of aerobic exercise weekly.  They understand the importance of following this regimen to prevent them from progressing into diabetes.  We will continue to monitor her hemoglobin A1c and if they do progress greater than 7% we will further modify the treatment regiment with medications excetra.    5. Primary hypertension   Patient appears to be tolerating their blood pressure medicine without any side effects.  We will continue to monitor their blood pressure and will adjust to keep there is systolic blood pressure less than 130.  We will continue to monitor renal function and will make adjustments based on these findings.    6. Mixed hyperlipidemia   Patient does appear to be tolerating their lipid-lowering agent without difficulty.  We will obtain the lipid profile as well as liver function test to observe for any abnormalities.  We will continue to adjust medications to keep their LDL less than 70.    7. Immunization refused   Patient would benefit from having immunizations given.  Patient has elected not to receive the recommended vaccines at present time.  They understand the risk of not receiving these vaccine and the disease in which they may incur.  We have discussed other options and will pursue with encouragement of compliance with future appointments.  If patient does change their minds prior to the next scheduled follow-up, they may contact us and we will provide immunization at that time.          Follow Up:   Return in about 3 months (around 11/8/2024).      At [facility], we believe that sharing information builds trust and better relationships. You are receiving this note because you recently visited [facility]. It is possible you will see health information before a provider has talked with you about it. This kind of information can be easy to misunderstand. To help you fully understand what  it means for your health, we urge you to discuss this note with your provider.    Jatinder Coulter MD  [facility]  Answers submitted by the patient for this visit:  Primary Reason for Visit (Submitted on 8/8/2024)  What is the primary reason for your visit?: Other  Other (Submitted on 8/8/2024)  Please describe your symptoms.: Testosterone labs followup.  Have you had these symptoms before?: No  How long have you been having these symptoms?: Greater than 2 weeks

## 2024-08-23 DIAGNOSIS — Z12.5 ENCOUNTER FOR PROSTATE CANCER SCREENING: Primary | ICD-10-CM

## 2024-08-26 ENCOUNTER — LAB (OUTPATIENT)
Dept: LAB | Facility: HOSPITAL | Age: 59
End: 2024-08-26
Payer: COMMERCIAL

## 2024-08-26 DIAGNOSIS — Z12.5 ENCOUNTER FOR PROSTATE CANCER SCREENING: ICD-10-CM

## 2024-08-26 PROCEDURE — G0103 PSA SCREENING: HCPCS

## 2024-08-26 PROCEDURE — 36415 COLL VENOUS BLD VENIPUNCTURE: CPT

## 2024-08-27 ENCOUNTER — TELEPHONE (OUTPATIENT)
Dept: FAMILY MEDICINE CLINIC | Facility: CLINIC | Age: 59
End: 2024-08-27
Payer: COMMERCIAL

## 2024-08-27 LAB — PSA SERPL-MCNC: 0.47 NG/ML (ref 0–4)

## 2024-08-27 NOTE — TELEPHONE ENCOUNTER
Contacted patient to speak about results. No anwser and voicemail left. Patient reviewed results on mychart.      Relay results

## 2024-08-27 NOTE — TELEPHONE ENCOUNTER
Name: Michel Lopez II      Relationship: Self      Best Callback Number:       HUB PROVIDED THE RELAY MESSAGE FROM THE OFFICE      PATIENT: VOICED UNDERSTANDING AND HAS NO FURTHER QUESTIONS AT THIS TIME    ADDITIONAL INFORMATION:

## 2024-08-27 NOTE — TELEPHONE ENCOUNTER
----- Message from Jatinder Coulter sent at 8/27/2024  5:59 AM EDT -----  PSA is good.  Hopefully, what this information, Milagros will be able to get the testosterone approved.

## 2024-08-27 NOTE — PROGRESS NOTES
Contacted patient to speak about results. No anwser and voicemail left. Patient reviewed results on [place]. Telephone encounter created.

## 2024-08-30 ENCOUNTER — TELEPHONE (OUTPATIENT)
Dept: FAMILY MEDICINE CLINIC | Facility: CLINIC | Age: 59
End: 2024-08-30
Payer: COMMERCIAL

## 2024-08-30 DIAGNOSIS — E29.1 HYPOGONADISM IN MALE: Primary | ICD-10-CM

## 2024-08-30 RX ORDER — TESTOSTERONE 25 MG/2.5G
50 GEL TRANSDERMAL DAILY
Qty: 150 G | Refills: 0 | Status: SHIPPED | OUTPATIENT
Start: 2024-08-30

## 2024-09-11 ENCOUNTER — OFFICE VISIT (OUTPATIENT)
Dept: GASTROENTEROLOGY | Facility: CLINIC | Age: 59
End: 2024-09-11
Payer: COMMERCIAL

## 2024-09-11 VITALS
BODY MASS INDEX: 34.56 KG/M2 | OXYGEN SATURATION: 94 % | SYSTOLIC BLOOD PRESSURE: 130 MMHG | WEIGHT: 228 LBS | HEART RATE: 81 BPM | DIASTOLIC BLOOD PRESSURE: 80 MMHG | HEIGHT: 68 IN

## 2024-09-11 DIAGNOSIS — Z86.010 HISTORY OF COLONIC POLYPS: ICD-10-CM

## 2024-09-11 DIAGNOSIS — K57.90 DIVERTICULOSIS: ICD-10-CM

## 2024-09-11 DIAGNOSIS — Z87.898 HISTORY OF ALCOHOL USE: ICD-10-CM

## 2024-09-11 DIAGNOSIS — E66.9 CLASS 1 OBESITY WITH SERIOUS COMORBIDITY AND BODY MASS INDEX (BMI) OF 34.0 TO 34.9 IN ADULT, UNSPECIFIED OBESITY TYPE: ICD-10-CM

## 2024-09-11 DIAGNOSIS — Z78.9 IMMUNE TO HEPATITIS B: ICD-10-CM

## 2024-09-11 DIAGNOSIS — K74.00 LIVER FIBROSIS: Primary | ICD-10-CM

## 2024-09-11 DIAGNOSIS — K75.81 METABOLIC DYSFUNCTION-ASSOCIATED STEATOHEPATITIS (MASH): ICD-10-CM

## 2024-09-11 RX ORDER — TRAMADOL HYDROCHLORIDE 50 MG/1
1 TABLET ORAL EVERY 12 HOURS SCHEDULED
COMMUNITY
Start: 2024-08-30

## 2024-09-11 NOTE — PATIENT INSTRUCTIONS
Recommend daily protein intake of 120 gram  Avoid NSAIDs such as ibuprofen, Advil, Motrin, diclofenac, meloxicam, naproxen. Limit use of acetaminophen to no more than 2,000 mg per day (patient may take acetaminophen 500 mg q 6 hours as needed for pain).  Avoid alcohol, tobacco and raw shellfish    Labs to check every 6 months:   CBC including platelet count  Comprehensive metabolic panel  INR  AFP  Check once per year  Vitamin D    Ultrasound liver once per year.      [facility], fibroscan would be helpful    If BMI is less than 30 in the future, consider US liver with elastography.

## 2024-09-11 NOTE — PROGRESS NOTES
GASTROENTEROLOGY OFFICE NOTE    Michel Lopez II  [id number]  1965    CARE TEAM  Patient Care Team:  Jatinder Coulter MD as PCP - General (Family Medicine)  Pilar Jones APRN as Nurse Practitioner (Gastroenterology)    Referring Provider: Jatinder Coulter*    Chief Complaint   Patient presents with    Hepatic Disease     MASH-New patient        HISTORY OF PRESENT ILLNESS:   Michel Lopez II is a 59 y.o. male who presents to the clinic today as a referral from Dr. Coulter for metabolic associated steatohepatitis, hepatic fibrosis. [facility].     [facility] Monte revealed 2 mm polyp in ascending colon, diverticulosis in left colon.  Recommendation to repeat colonoscopy in 7.  Ascending colon biopsy was tubular adenoma.    3/19/2024 [facility] FibroSure revealed F3, S1, N2.    4/15/2024 comprehensive metabolic panel revealed normal sodium 140.  Normal albumin 4.2.  AST normal 34, ALT normal 35.  Alkaline phosphatase normal 86.  Bilirubin normal 0.3.    5/16/2024 ultrasound revealed medical hepatic parenchymal disease, no mass, no ascites, normal common bile duct. I do not believe elastography was previously performed     4/24/2024 bilirubin 0.4.  INR 0.98.  CBC normal including normal platelet count of 224.  Hepatitis C FibroSure revealed result of 0.64 correlating with stage III bridging fibrosis with many septa.  ALT normal 39.  [facility]    5/24/2024 HCV nonreactive.  Hepatitis A IgG reactive.     6/14/2024 ferritin 44.9.  Iron profile normal with iron saturation 24.  Hepatitis B surface antibody reactive.  Alpha 1 antitrypsin total 101.  Hepatitis A antibody total negative (vaccine recommended per primary care provider's office.  Ceruloplasmin 21.  Hepatitis B surface antigen negative.  Hepatitis B core total antibody negative.    He reports history of drinking alcohol but stopped  drinking June 2023.  He also reports weight loss of 50 pounds or more.      Past Medical History:   Diagnosis Date    Allergic 1980    PCN    Arthritis     shoulders    Chronic back pain     Hyperlipidemia     Hypertension     Liver disease 04/03/2023    Fatty Liver    Low back pain 1998    Obesity 1995    Sleep apnea     Does not use CPAP machine.     Substance abuse 2023    Carl longer drinking (6/2026)    Wears glasses     Wears partial dentures     both upper and lower partials        Past Surgical History:   Procedure Laterality Date    AMPUTATION FINGER / THUMB      reattached  4 fingers including thumb in 12/1993 in work accident    COLONOSCOPY      last colonoscopy was with in last 5 years    FRACTURE SURGERY  12/1993    Right hand multiple reimplantations    INGUINAL HERNIA REPAIR  07/2023    JOINT REPLACEMENT  2020    Left knee 2022 R Shoulder    KNEE ARTHROPLASTY, PARTIAL REPLACEMENT      left    SHOULDER ARTHROSCOPY      right    SHOULDER ARTHROSCOPY W/ CAPSULAR REPAIR Left 10/12/2021    Procedure: ARTHROSCOPY LEFT SHOULDER WITH ROTATOR CUFF REPAIR;  Surgeon: Obi Hernández Jr., MD;  Location:  [facility];  Service: Orthopedics;  Laterality: Left;    TOTAL SHOULDER ARTHROPLASTY Right 05/24/2022    Procedure: TOTAL REVERSE  SHOULDER ARTHROPLASTY WITH BICEPS TENDONESIS RIGHT;  Surgeon: Obi Henrández Jr., MD;  Location:  [facility];  Service: Orthopedics;  Laterality: Right;    VASECTOMY  2002        Current Outpatient Medications on File Prior to Visit   Medication Sig    DULoxetine (CYMBALTA) 60 MG capsule Take 1 capsule by mouth Daily. (Patient taking differently: Take 90 mg by mouth Daily.)    ezetimibe (Zetia) 10 MG tablet Take 1 tablet by mouth Daily.    HYDROcodone-acetaminophen (NORCO) 7.5-325 MG per tablet Take 1 tablet every 6 hours by oral route for 3 days.    Loratadine 10 MG capsule Take 1 capsule by mouth Every Morning.    losartan (COZAAR) 25 MG tablet Take 1 tablet by mouth Daily.     "metoprolol succinate XL (TOPROL-XL) 100 MG 24 hr tablet     Metoprolol-HCTZ -12.5 MG tablet sustained-release 24 hour     Multiple Vitamins-Minerals (Multivitamin Adult, Minerals,) tablet Take 1 tablet by mouth Daily.    omeprazole (priLOSEC) 20 MG capsule Take 1 capsule by mouth Daily.    pravastatin (PRAVACHOL) 40 MG tablet     Semaglutide-Weight Management (Wegovy) 2.4 MG/0.75ML solution auto-injector Inject 2.4 mg under the skin into the appropriate area as directed 1 (One) Time Per Week.    testosterone (AndroGel) 25 MG/2.5GM (1%) gel gel Place 50 mg on the skin as directed by provider Daily.    traMADol (ULTRAM) 50 MG tablet Take 1 tablet by mouth Every 12 (Twelve) Hours.    valACYclovir (VALTREX) 1000 MG tablet      No current facility-administered medications on file prior to visit.       Allergies   Allergen Reactions    Penicillins Hives    Ace Inhibitors Cough       Family History   Problem Relation Age of Onset    Hypertension Mother     Hyperlipidemia Mother     Thyroid disease Mother     Arthritis Mother     Hypertension Father     Arthritis Father     Colon cancer Neg Hx        Social History     Socioeconomic History    Marital status:    Tobacco Use    Smoking status: Every Day     Types: Electronic Cigarette     Passive exposure: Current    Smokeless tobacco: Never    Tobacco comments:     quit 4 years ago   Vaping Use    Vaping status: Every Day    Start date: 5/1/2015    Substances: Nicotine, Flavoring    Devices: Disposable    Passive vaping exposure: Yes   Substance and Sexual Activity    Alcohol use: Not Currently     Alcohol/week: 56.0 standard drinks of alcohol     Comment: 8 shots in a day     Drug use: Never    Sexual activity: Yes     Partners: Female     Birth control/protection: Post-menopausal, Vasectomy       PHYSICAL EXAM   /80 (BP Location: Left arm, Patient Position: Sitting, Cuff Size: Adult)   Pulse 81   Ht 172.7 cm (68\")   Wt 103 kg (228 lb)   SpO2 94%   " BMI 34.67 kg/m²   Physical Exam  Constitutional:       General: He is not in acute distress.     Appearance: He is not toxic-appearing.   HENT:      Head: Normocephalic and atraumatic. No contusion.      Right Ear: External ear normal.      Left Ear: External ear normal.   Eyes:      General: Lids are normal. No scleral icterus.        Right eye: No discharge.         Left eye: No discharge.      Extraocular Movements: Extraocular movements intact.   Neck:      Trachea: Trachea normal.      Comments: No visible mass  No visible adenopathy  Cardiovascular:      Rate and Rhythm: Normal rate.   Pulmonary:      Effort: No respiratory distress.      Comments: Symmetrical expansion    Abdominal:      Palpations: Abdomen is soft. There is no mass.      Tenderness: There is no abdominal tenderness.   Musculoskeletal:      Right lower leg: No edema.      Left lower leg: No edema.      Comments: Symmetrical movement of upper extremities  Symmetrical movement of lower extremities  No visible deformities   Skin:     General: Skin is warm and dry.      Coloration: Skin is not jaundiced.   Neurological:      General: No focal deficit present.      Mental Status: He is alert and oriented to person, place, and time.   Psychiatric:         Mood and Affect: Mood normal.         Behavior: Behavior normal.         Thought Content: Thought content normal.         Results Review:  [facility] Monte revealed 2 mm polyp in ascending colon, diverticulosis in left colon.  Recommendation to repeat colonoscopy in 7.  Ascending colon biopsy was tubular adenoma.    3/19/2024 [facility] FibroSure revealed F3, S1, N2.    4/15/2024 comprehensive metabolic panel revealed normal sodium 140.  Normal albumin 4.2.  AST normal 34, ALT normal 35.  Alkaline phosphatase normal 86.  Bilirubin normal 0.3.    5/16/2024 ultrasound revealed medical hepatic parenchymal disease, no mass, no ascites, normal common bile duct. I do not believe  elastography was previously performed     4/24/2024 bilirubin 0.4.  INR 0.98.  CBC normal including normal platelet count of 224.  Hepatitis C FibroSure revealed result of 0.64 correlating with stage III bridging fibrosis with many septa.  ALT normal 39.  [facility]    5/24/2024 HCV nonreactive.  Hepatitis A IgG reactive.     6/14/2024 ferritin 44.9.  Iron profile normal with iron saturation 24.  Hepatitis B surface antibody reactive.  Alpha 1 antitrypsin total 101.  Hepatitis A antibody total negative (vaccine recommended per primary care provider's office.  Ceruloplasmin 21.  Hepatitis B surface antigen negative.  Hepatitis B core total antibody negative.  CMP          12/15/2023    10:48 3/19/2024    14:38 6/14/2024    15:10   CMP   Glucose 82  86  87    BUN 14  15  17    Creatinine 0.89  0.79  0.97    EGFR 98.7  102.3  89.9    Sodium 137  136  136    Potassium 4.1  4.2  4.9    Chloride 102  100  99    Calcium 10.0  9.8  9.9    Total Protein 7.7  7.4  8.0    Albumin 4.4  4.1  4.5    Globulin 3.3  3.3  3.5    Total Bilirubin 0.3  0.4  0.4    Alkaline Phosphatase 101  90  104    AST (SGOT) 42  26  31    ALT (SGPT) 49  31  29    Albumin/Globulin Ratio 1.3  1.2  1.3    BUN/Creatinine Ratio 15.7  19.0  17.5    Anion Gap 12.1  13.0  13.4      CBC          12/15/2023    10:48 6/14/2024    15:10   CBC   WBC 6.30  7.13    RBC 4.77  4.92    Hemoglobin 15.4  15.2    Hematocrit 43.5  46.2    MCV 91.2  93.9    MCH 32.3  30.9    MCHC 35.4  32.9    RDW 13.1  12.4    Platelets 256  260        ASSESSMENT / PLAN  1. Liver fibrosis  2. Immune to hepatitis B  3. History of alcohol use  4. Metabolic dysfunction-associated steatohepatitis (MASH)  5. Class 1 obesity with serious comorbidity and body mass index (BMI) of 34.0 to 34.9 in adult, unspecified obesity type  - 3/2024 [facility] fibrosure revealed F3, 4/2024 [facility] F3  - we discussed if it difficult to know if fibrosure is accurate  or potentially underestimating or over estimating fibrosis, liver biopsy could be considered but recommend lab monitoring every 6 months (see below) and US at least once per year (consider elastography if BMI is less than 30 as Elastography may be unreliable with BMI greater than 30). If [facility] has access to fib[facility]an, recommend fibroscan  -  recommend every 6 months to check CBC to evaluate platelet count (platelet count less than 120 is concerning for advanced liver disease); check CMP to evaluate liver enzymes and albumin (hypoalbuminemia is concerning for advanced liver disease); check INR (if INR is prolonged this may raise suspicion for advancing liver disease/fibrosis), check AFP and once per year check vitamin D level  - do not currently suspect cirrhosis due to lab results reviewed and US without mention of changes of cirrhosis    - he reports lab orders should go through PCP, I have provided a list of labs I would liked checked every 6 months to 1 year as well as recommendations regarding imaging, at this time, he plans to follow up here as well as [facility]  - try to avoid weight gain, continued weight loss could be helpful (he has lost 50 pounds)  - he has history of elevated liver enzymes but liver enzymes recently normal!     - recommend protein intake of 120 grams per day.   - Avoid NSAIDs such as ibuprofen, Advil, Motrin, diclofenac, meloxicam, naproxen. Limit use of acetaminophen to no more than 2,000 mg per day (patient may take acetaminophen 500 mg q 6 hours as needed for pain).  Avoid alcohol, tobacco and  consider avoiding raw shellfish  6. History of colonic polyps  - due for repeat colonoscopy 2/2028    7. Diverticulosis  - identified at time of colonoscopy in 2021, try to avoid constipation  Return in about 6 months (around 3/11/2025).    Pilar Jones, DANIEL  09/11/2024

## 2024-09-11 NOTE — Clinical Note
Patient's PCP is Dr. Errol Wu. Her address in the system does not seem correct to me as she is [facility]. Can you please try to get today's note to [facility] at the [facility]. Thank you!

## 2024-09-19 ENCOUNTER — PATIENT MESSAGE (OUTPATIENT)
Dept: FAMILY MEDICINE CLINIC | Facility: CLINIC | Age: 59
End: 2024-09-19
Payer: COMMERCIAL

## 2024-10-16 ENCOUNTER — OFFICE VISIT (OUTPATIENT)
Dept: FAMILY MEDICINE CLINIC | Facility: CLINIC | Age: 59
End: 2024-10-16
Payer: COMMERCIAL

## 2024-10-16 VITALS
SYSTOLIC BLOOD PRESSURE: 102 MMHG | HEIGHT: 68 IN | HEART RATE: 87 BPM | BODY MASS INDEX: 34.28 KG/M2 | OXYGEN SATURATION: 95 % | WEIGHT: 226.2 LBS | DIASTOLIC BLOOD PRESSURE: 80 MMHG | RESPIRATION RATE: 16 BRPM | TEMPERATURE: 98.6 F

## 2024-10-16 DIAGNOSIS — Z28.21 IMMUNIZATION REFUSED: ICD-10-CM

## 2024-10-16 DIAGNOSIS — E55.9 VITAMIN D DEFICIENCY: ICD-10-CM

## 2024-10-16 DIAGNOSIS — K75.81 METABOLIC DYSFUNCTION-ASSOCIATED STEATOHEPATITIS (MASH): ICD-10-CM

## 2024-10-16 DIAGNOSIS — R73.09 ELEVATED HEMOGLOBIN A1C: Primary | ICD-10-CM

## 2024-10-16 DIAGNOSIS — E78.2 MIXED HYPERLIPIDEMIA: ICD-10-CM

## 2024-10-16 DIAGNOSIS — E29.1 HYPOGONADISM IN MALE: ICD-10-CM

## 2024-10-16 DIAGNOSIS — I10 PRIMARY HYPERTENSION: ICD-10-CM

## 2024-10-16 DIAGNOSIS — K74.00 HEPATIC FIBROSIS: ICD-10-CM

## 2024-10-16 PROCEDURE — 99214 OFFICE O/P EST MOD 30 MIN: CPT | Performed by: FAMILY MEDICINE

## 2024-10-16 RX ORDER — SEMAGLUTIDE 1.7 MG/.75ML
1.7 INJECTION, SOLUTION SUBCUTANEOUS WEEKLY
Qty: 3 ML | Refills: 11 | Status: SHIPPED | OUTPATIENT
Start: 2024-10-16

## 2024-10-16 RX ORDER — BACLOFEN 10 MG/1
TABLET ORAL
COMMUNITY
Start: 2024-10-15

## 2024-10-16 RX ORDER — LOSARTAN POTASSIUM 25 MG/1
12.5 TABLET ORAL DAILY
Start: 2024-10-16

## 2024-10-16 NOTE — PROGRESS NOTES
Follow Up Office Visit      Date: 10/16/2024   Patient Name: Michel Lopez II  : 1965   MRN: [id number]     Chief Complaint:    Chief Complaint   Patient presents with    Follow-up     3 month    Hypertension    Hyperlipidemia       History of Present Illness: Michel Lopez II is a 59 y.o. male who is here today for follow-up  History of Present Illness  The patient is a 59-year-old male who returns for follow-up. He is accompanied by an adult female.    He reports that his pain medication was recently changed to baclofen, a muscle relaxer, which he has not yet started taking. He is tolerating his other medications well, including duloxetine, losartan, and metoprolol. His blood pressure readings at home have been low, but he is not experiencing any dizziness or lightheadedness upon standing. He has a history of high blood pressure over the past three years, which he attributes to heavy alcohol consumption. He has since quit drinking and is considering adjusting his blood pressure medication due to persistent fatigue.    He is also on testosterone therapy. He does not believe AndroGel is effective for him and has stopped using it. He has tried a new testosterone gel a few times but found it unsatisfactory.  Patient is want to try a injectable testosterone to see if it would be more beneficial.  He understands that he can give the shot to himself and does not require an appointment.  If it is given by his wife he will have to be cautious.    He is managing well with omeprazole, taking it once daily, and does not experience reflux or heartburn. He is also on pravastatin for cholesterol management.    He missed a dose of Wegovy during a recent vacation, which resulted in nausea and vomiting. He is not currently exercising. He reports good appetite and sleep.  Patient appears to be doing otherwise well.  They have continue with their medications without any side effects.  They have not had any  changes in their usual activity, appetite and sleep.  Patient denies any other cardiovascular, respiratory, gastrointestinal, urologic or neurologic complaints.       Subjective      Review of Systems:   Review of Systems   Constitutional:  Positive for fatigue. Negative for activity change and appetite change.   Respiratory:  Negative for cough, chest tightness, shortness of breath and wheezing.    Cardiovascular:  Negative for chest pain, palpitations and leg swelling.   Gastrointestinal:  Positive for nausea and indigestion. Negative for abdominal distention, abdominal pain, blood in stool, constipation, diarrhea, vomiting and GERD.   Genitourinary:  Negative for difficulty urinating, dysuria, flank pain, frequency, hematuria and urgency.   Musculoskeletal:  Positive for arthralgias. Negative for back pain, gait problem, joint swelling and myalgias.   Neurological:  Negative for dizziness, tremors, seizures, syncope, weakness, light-headedness, numbness, headache and memory problem.   Psychiatric/Behavioral:  Negative for sleep disturbance and depressed mood. The patient is not nervous/anxious.        I have reviewed the patients family history, social history, past medical history, past surgical history and have updated it as appropriate.     Medications:     Current Outpatient Medications:     baclofen (LIORESAL) 10 MG tablet, , Disp: , Rfl:     DULoxetine (CYMBALTA) 60 MG capsule, Take 1 capsule by mouth Daily., Disp: , Rfl:     ezetimibe (Zetia) 10 MG tablet, Take 1 tablet by mouth Daily., Disp: , Rfl:     Loratadine 10 MG capsule, Take 1 capsule by mouth Every Morning., Disp: , Rfl:     losartan (COZAAR) 25 MG tablet, Take 0.5 tablets by mouth Daily., Disp: , Rfl:     metoprolol succinate XL (TOPROL-XL) 100 MG 24 hr tablet, 0.5 tablets Daily., Disp: , Rfl:     Multiple Vitamins-Minerals (Multivitamin Adult, Minerals,) tablet, Take 1 tablet by mouth Daily., Disp: , Rfl:     omeprazole (priLOSEC) 20 MG  "capsule, Take 1 capsule by mouth Daily., Disp: , Rfl:     pravastatin (PRAVACHOL) 40 MG tablet, , Disp: , Rfl:     valACYclovir (VALTREX) 1000 MG tablet, , Disp: , Rfl:     Semaglutide-Weight Management (Wegovy) 1.7 MG/0.75ML solution auto-injector, Inject 0.75 mL under the skin into the appropriate area as directed 1 (One) Time Per Week., Disp: 3 mL, Rfl: 11    Testosterone Cypionate 200 MG/ML kit, Inject 200 mg into the appropriate muscle as directed by prescriber Every 14 (Fourteen) Days., Disp: 2 kit, Rfl: 5    Allergies:   Allergies   Allergen Reactions    Penicillins Hives    Ace Inhibitors Cough       Immunizations:   Immunization History   Administered Date(s) Administered    COVID-19 (MODERNA) 1st,2nd,3rd Dose Monovalent 11/19/2021    COVID-19 (PFIZER) Purple Cap Monovalent 03/23/2021, 04/13/2021    Shingrix 11/12/2019, 02/11/2020    Tdap 05/17/2017        Objective     Physical Exam: Please see above  Vital Signs:   Vitals:    10/16/24 1642   BP: 102/80   BP Location: Left arm   Patient Position: Sitting   Cuff Size: Large Adult   Pulse: 87   Resp: 16   Temp: 98.6 °F (37 °C)   TempSrc: Temporal   SpO2: 95%   Weight: 103 kg (226 lb 3.2 oz)   Height: 172.7 cm (68\")     Body mass index is 34.39 kg/m².          Physical Exam  Vitals and nursing note reviewed.   Constitutional:       Appearance: Normal appearance.   HENT:      Head: Normocephalic and atraumatic.      Nose: Nose normal.      Mouth/Throat:      Pharynx: Oropharynx is clear.   Eyes:      Extraocular Movements: Extraocular movements intact.      Pupils: Pupils are equal, round, and reactive to light.   Neck:      Thyroid: No thyroid mass or thyromegaly.      Trachea: Trachea normal.   Cardiovascular:      Rate and Rhythm: Normal rate and regular rhythm.      Pulses: Normal pulses. No decreased pulses.      Heart sounds: Normal heart sounds.   Pulmonary:      Effort: Pulmonary effort is normal.      Breath sounds: Normal breath sounds.   Abdominal: "      General: Abdomen is flat. Bowel sounds are normal.      Palpations: Abdomen is soft.      Tenderness: There is no abdominal tenderness.   Musculoskeletal:      Cervical back: Neck supple.      Right lower leg: No edema.      Left lower leg: No edema.   Lymphadenopathy:      Cervical: No cervical adenopathy.   Skin:     General: Skin is warm and dry.   Neurological:      General: No focal deficit present.      Mental Status: He is alert and oriented to person, place, and time.      Sensory: Sensation is intact.      Motor: Motor function is intact.      Coordination: Coordination is intact.   Psychiatric:         Attention and Perception: Attention normal.         Mood and Affect: Mood normal.         Speech: Speech normal.         Behavior: Behavior normal.         Procedures    Results:   Labs:   Hemoglobin A1C   Date Value Ref Range Status   06/14/2024 5.30 4.80 - 5.60 % Final        POCT Results (if applicable):   Results for orders placed or performed in visit on 08/26/24   PSA Screen    Collection Time: 08/26/24  9:09 AM    Specimen: Blood   Result Value Ref Range    PSA 0.467 0.000 - 4.000 ng/mL       Imaging:   No valid procedures specified.     Measures:   Advanced Care Planning:   Patient does not have an advance directive, information provided.    Smoking Cessation:   Non-smoker.    Assessment / Plan      Assessment/Plan:   Diagnoses and all orders for this visit:    1. Elevated hemoglobin A1c (Primary)  Patient does have a history of elevated hemoglobin A1c.  They have continue with a low carbohydrate/low-fat diet and is attempting 300 minutes of aerobic exercise weekly.  They understand the importance of following this regimen to prevent them from progressing into diabetes.  We will continue to monitor her hemoglobin A1c and if they do progress greater than 7% we will further modify the treatment regiment with medications excetra.  Patient has had some increase in nausea associated with the higher  dose of Wegovy.  We will reduce the dose back to 1.7 mg weekly and will monitor.  If he does have persistent symptoms we may need to reassess the use of the GLP-1 agonist class.  -     Semaglutide-Weight Management (Wegovy) 1.7 MG/0.75ML solution auto-injector; Inject 0.75 mL under the skin into the appropriate area as directed 1 (One) Time Per Week.  Dispense: 3 mL; Refill: 11    2. Primary hypertension  Patient appears to be tolerating their blood pressure medicine without any side effects.  We will continue to monitor their blood pressure and will adjust to keep there is systolic blood pressure less than 130.  We will continue to monitor renal function and will make adjustments based on these findings.  We will reduce losartan to half a tablet a day and hopefully he we will have improvement of his blood pressure without any symptomatology.  -     losartan (COZAAR) 25 MG tablet; Take 0.5 tablets by mouth Daily.    3. Metabolic dysfunction-associated steatohepatitis (MASH)  Patient did see the gastroenterologist and does appear that he is doing relatively at present time.  He will continue with the GLP-1 agonists as well as the low carbohydrate/low-fat diet.  We have encouraged him to initiate 300 minutes of aerobic exercise as this would be very beneficial.  We will continue to monitor laboratory data as suggested by the gastroenterologist.  -     AFP Tumor Marker; Future  -     Protime-INR; Future    4. Hepatic fibrosis    5. Mixed hyperlipidemia  ` Patient does appear to be tolerating their lipid-lowering agent without difficulty.  We will obtain the lipid profile as well as liver function test to observe for any abnormalities.  We will continue to adjust medications to keep their LDL less than 70.  -     Lipid Panel; Future    6. Immunization refused   Patient would benefit from having immunizations given.  Patient has elected not to receive the recommended vaccines at present time.  They understand the risk of  not receiving these vaccine and the disease in which they may incur.  We have discussed other options and will pursue with encouragement of compliance with future appointments.  If patient does change their minds prior to the next scheduled follow-up, they may contact us and we will provide immunization at that time.    7. Hypogonadism in male  Patient has had difficulty with respect to use of testosterone.  We will pursue with the initiation of injectable testosterone at 200 mg every other week.  We will reassess in 3 months time.  He understands the importance of follow-up to assess complications of the testosterone.  His wife is not to give him the testosterone injections if possible.  If she does she is not to have any of the oral applied to her skin.  We will continue to monitor and will assess as necessary.  -     CBC (No Diff); Future  -     Comprehensive Metabolic Panel; Future  -     PSA DIAGNOSTIC; Future  -     Testosterone; Future  -     Testosterone Cypionate 200 MG/ML kit; Inject 200 mg into the appropriate muscle as directed by prescriber Every 14 (Fourteen) Days.  Dispense: 2 kit; Refill: 5    8. Vitamin D deficiency  -     Vitamin D,25-Hydroxy; Future        Follow Up:   Return in about 3 months (around 1/16/2025).      At [facility], we believe that sharing information builds trust and better relationships. You are receiving this note because you recently visited [facility]. It is possible you will see health information before a provider has talked with you about it. This kind of information can be easy to misunderstand. To help you fully understand what it means for your health, we urge you to discuss this note with your provider.    Jatinder Coulter MD  [facility]    Patient or patient representative verbalized consent for the use of Ambient Listening during the visit with  Jatinder Coulter MD for chart documentation. 10/16/2024  18:27 EDT   Answers submitted by the  patient for this visit:    Other (Submitted on 10/14/2024)  Please describe your symptoms.: Low T Followup  Have you had these symptoms before?: Yes  How long have you been having these symptoms?: Greater than 2 weeks  Primary Reason for Visit (Submitted on 10/14/2024)  What is the primary reason for your visit?: Problem Not Listed

## 2024-10-18 ENCOUNTER — PATIENT MESSAGE (OUTPATIENT)
Dept: FAMILY MEDICINE CLINIC | Facility: CLINIC | Age: 59
End: 2024-10-18
Payer: COMMERCIAL

## 2024-10-25 ENCOUNTER — LAB (OUTPATIENT)
Dept: LAB | Facility: HOSPITAL | Age: 59
End: 2024-10-25
Payer: COMMERCIAL

## 2024-10-25 DIAGNOSIS — E29.1 HYPOGONADISM IN MALE: ICD-10-CM

## 2024-10-25 LAB — TESTOST SERPL-MCNC: >1500 NG/DL (ref 193–740)

## 2024-10-25 PROCEDURE — 84403 ASSAY OF TOTAL TESTOSTERONE: CPT

## 2024-10-28 ENCOUNTER — TELEPHONE (OUTPATIENT)
Dept: FAMILY MEDICINE CLINIC | Facility: CLINIC | Age: 59
End: 2024-10-28
Payer: COMMERCIAL

## 2024-10-28 NOTE — PROGRESS NOTES
Contacted patient to speak about results. No answer, left voicemail asking patient to contact our office to review results. Patient also reviewed results on [facility]. Telephone encounter created.

## 2024-10-28 NOTE — TELEPHONE ENCOUNTER
Contacted the patient to discuss results. No answer; voicemail left asking the patient to return the call to discuss results.

## 2024-10-28 NOTE — TELEPHONE ENCOUNTER
----- Message from Jatinder Coulter sent at 10/26/2024  6:44 AM EDT -----  Testosterone is supratherapeutic.  How is he taking the testosterone?  I have that he is taking 200 mg IM every 2 weeks.  If this is the case, we need to reduce the dose to 100 mg IM every 2 weeks and recheck when he returns to clinic in January.

## 2024-10-30 ENCOUNTER — LAB (OUTPATIENT)
Dept: LAB | Facility: HOSPITAL | Age: 59
End: 2024-10-30
Payer: COMMERCIAL

## 2024-10-30 DIAGNOSIS — K75.81 METABOLIC DYSFUNCTION-ASSOCIATED STEATOHEPATITIS (MASH): ICD-10-CM

## 2024-10-30 DIAGNOSIS — E55.9 VITAMIN D DEFICIENCY: ICD-10-CM

## 2024-10-30 DIAGNOSIS — E29.1 HYPOGONADISM IN MALE: ICD-10-CM

## 2024-10-30 DIAGNOSIS — E78.2 MIXED HYPERLIPIDEMIA: ICD-10-CM

## 2024-10-30 LAB
25(OH)D3 SERPL-MCNC: 33.5 NG/ML (ref 30–100)
ALBUMIN SERPL-MCNC: 4.3 G/DL (ref 3.5–5.2)
ALBUMIN/GLOB SERPL: 1.2 G/DL
ALP SERPL-CCNC: 87 U/L (ref 39–117)
ALPHA-FETOPROTEIN: 2.24 NG/ML (ref 0–8.3)
ALT SERPL W P-5'-P-CCNC: 40 U/L (ref 1–41)
ANION GAP SERPL CALCULATED.3IONS-SCNC: 11 MMOL/L (ref 5–15)
AST SERPL-CCNC: 35 U/L (ref 1–40)
BILIRUB SERPL-MCNC: 0.4 MG/DL (ref 0–1.2)
BUN SERPL-MCNC: 15 MG/DL (ref 8–23)
BUN/CREAT SERPL: 15.3 (ref 7–25)
CALCIUM SPEC-SCNC: 9.8 MG/DL (ref 8.6–10.5)
CHLORIDE SERPL-SCNC: 101 MMOL/L (ref 98–107)
CHOLEST SERPL-MCNC: 143 MG/DL (ref 0–200)
CO2 SERPL-SCNC: 26 MMOL/L (ref 22–29)
CREAT SERPL-MCNC: 0.98 MG/DL (ref 0.76–1.27)
DEPRECATED RDW RBC AUTO: 43 FL (ref 37–54)
EGFRCR SERPLBLD CKD-EPI 2021: 88.3 ML/MIN/1.73
ERYTHROCYTE [DISTWIDTH] IN BLOOD BY AUTOMATED COUNT: 12.2 % (ref 12.3–15.4)
GLOBULIN UR ELPH-MCNC: 3.5 GM/DL
GLUCOSE SERPL-MCNC: 80 MG/DL (ref 65–99)
HCT VFR BLD AUTO: 46.3 % (ref 37.5–51)
HDLC SERPL-MCNC: 53 MG/DL (ref 40–60)
HGB BLD-MCNC: 14.9 G/DL (ref 13–17.7)
INR PPP: 0.89 (ref 0.89–1.12)
LDLC SERPL CALC-MCNC: 74 MG/DL (ref 0–100)
LDLC/HDLC SERPL: 1.39 {RATIO}
MCH RBC QN AUTO: 30.8 PG (ref 26.6–33)
MCHC RBC AUTO-ENTMCNC: 32.2 G/DL (ref 31.5–35.7)
MCV RBC AUTO: 95.7 FL (ref 79–97)
PLATELET # BLD AUTO: 288 10*3/MM3 (ref 140–450)
PMV BLD AUTO: 9.2 FL (ref 6–12)
POTASSIUM SERPL-SCNC: 4.7 MMOL/L (ref 3.5–5.2)
PROT SERPL-MCNC: 7.8 G/DL (ref 6–8.5)
PROTHROMBIN TIME: 12.2 SECONDS (ref 12.2–14.5)
PSA SERPL-MCNC: 0.76 NG/ML (ref 0–4)
RBC # BLD AUTO: 4.84 10*6/MM3 (ref 4.14–5.8)
SODIUM SERPL-SCNC: 138 MMOL/L (ref 136–145)
TRIGL SERPL-MCNC: 81 MG/DL (ref 0–150)
VLDLC SERPL-MCNC: 16 MG/DL (ref 5–40)
WBC NRBC COR # BLD AUTO: 6.81 10*3/MM3 (ref 3.4–10.8)

## 2024-10-30 PROCEDURE — 85027 COMPLETE CBC AUTOMATED: CPT

## 2024-10-30 PROCEDURE — 80061 LIPID PANEL: CPT

## 2024-10-30 PROCEDURE — 80053 COMPREHEN METABOLIC PANEL: CPT

## 2024-10-30 PROCEDURE — 84153 ASSAY OF PSA TOTAL: CPT

## 2024-10-30 PROCEDURE — 85610 PROTHROMBIN TIME: CPT

## 2024-10-30 PROCEDURE — 82105 ALPHA-FETOPROTEIN SERUM: CPT

## 2024-10-30 PROCEDURE — 82306 VITAMIN D 25 HYDROXY: CPT

## 2024-10-30 PROCEDURE — 36415 COLL VENOUS BLD VENIPUNCTURE: CPT

## 2024-11-08 ENCOUNTER — LAB (OUTPATIENT)
Dept: LAB | Facility: HOSPITAL | Age: 59
End: 2024-11-08
Payer: COMMERCIAL

## 2024-11-08 DIAGNOSIS — E29.1 HYPOGONADISM IN MALE: ICD-10-CM

## 2024-11-08 LAB — TESTOST SERPL-MCNC: >1500 NG/DL (ref 193–740)

## 2024-11-08 PROCEDURE — 84403 ASSAY OF TOTAL TESTOSTERONE: CPT

## 2024-11-11 NOTE — PROGRESS NOTES
Patient was informed of results and verbalized good understanding and appreciation. He has decided to just quit the testosterone completely

## 2025-01-08 ENCOUNTER — PATIENT MESSAGE (OUTPATIENT)
Dept: FAMILY MEDICINE CLINIC | Facility: CLINIC | Age: 60
End: 2025-01-08
Payer: COMMERCIAL

## 2025-01-08 DIAGNOSIS — E29.1 HYPOGONADISM IN MALE: Primary | ICD-10-CM

## 2025-01-13 ENCOUNTER — LAB (OUTPATIENT)
Dept: LAB | Facility: HOSPITAL | Age: 60
End: 2025-01-13
Payer: COMMERCIAL

## 2025-01-13 DIAGNOSIS — E29.1 HYPOGONADISM IN MALE: ICD-10-CM

## 2025-01-13 LAB
DEPRECATED RDW RBC AUTO: 40.2 FL (ref 37–54)
ERYTHROCYTE [DISTWIDTH] IN BLOOD BY AUTOMATED COUNT: 12 % (ref 12.3–15.4)
HCT VFR BLD AUTO: 46.3 % (ref 37.5–51)
HGB BLD-MCNC: 15.4 G/DL (ref 13–17.7)
MCH RBC QN AUTO: 30.6 PG (ref 26.6–33)
MCHC RBC AUTO-ENTMCNC: 33.3 G/DL (ref 31.5–35.7)
MCV RBC AUTO: 91.9 FL (ref 79–97)
PLATELET # BLD AUTO: 246 10*3/MM3 (ref 140–450)
PMV BLD AUTO: 9.9 FL (ref 6–12)
RBC # BLD AUTO: 5.04 10*6/MM3 (ref 4.14–5.8)
WBC NRBC COR # BLD AUTO: 7.8 10*3/MM3 (ref 3.4–10.8)

## 2025-01-13 PROCEDURE — 84402 ASSAY OF FREE TESTOSTERONE: CPT

## 2025-01-13 PROCEDURE — 84153 ASSAY OF PSA TOTAL: CPT

## 2025-01-13 PROCEDURE — 84403 ASSAY OF TOTAL TESTOSTERONE: CPT

## 2025-01-13 PROCEDURE — 80053 COMPREHEN METABOLIC PANEL: CPT

## 2025-01-13 PROCEDURE — 85027 COMPLETE CBC AUTOMATED: CPT

## 2025-01-14 LAB
ALBUMIN SERPL-MCNC: 4.4 G/DL (ref 3.5–5.2)
ALBUMIN/GLOB SERPL: 1.5 G/DL
ALP SERPL-CCNC: 97 U/L (ref 39–117)
ALT SERPL W P-5'-P-CCNC: 27 U/L (ref 1–41)
ANION GAP SERPL CALCULATED.3IONS-SCNC: 9 MMOL/L (ref 5–15)
AST SERPL-CCNC: 33 U/L (ref 1–40)
BILIRUB SERPL-MCNC: 0.3 MG/DL (ref 0–1.2)
BUN SERPL-MCNC: 14 MG/DL (ref 8–23)
BUN/CREAT SERPL: 13.2 (ref 7–25)
CALCIUM SPEC-SCNC: 10 MG/DL (ref 8.6–10.5)
CHLORIDE SERPL-SCNC: 103 MMOL/L (ref 98–107)
CO2 SERPL-SCNC: 26 MMOL/L (ref 22–29)
CREAT SERPL-MCNC: 1.06 MG/DL (ref 0.76–1.27)
EGFRCR SERPLBLD CKD-EPI 2021: 80.3 ML/MIN/1.73
GLOBULIN UR ELPH-MCNC: 3 GM/DL
GLUCOSE SERPL-MCNC: 77 MG/DL (ref 65–99)
POTASSIUM SERPL-SCNC: 4.9 MMOL/L (ref 3.5–5.2)
PROT SERPL-MCNC: 7.4 G/DL (ref 6–8.5)
PSA SERPL-MCNC: 0.47 NG/ML (ref 0–4)
SODIUM SERPL-SCNC: 138 MMOL/L (ref 136–145)

## 2025-01-15 ENCOUNTER — OFFICE VISIT (OUTPATIENT)
Dept: FAMILY MEDICINE CLINIC | Facility: CLINIC | Age: 60
End: 2025-01-15
Payer: COMMERCIAL

## 2025-01-15 VITALS
DIASTOLIC BLOOD PRESSURE: 74 MMHG | WEIGHT: 208.6 LBS | RESPIRATION RATE: 16 BRPM | BODY MASS INDEX: 31.61 KG/M2 | HEIGHT: 68 IN | SYSTOLIC BLOOD PRESSURE: 102 MMHG | TEMPERATURE: 98.3 F | OXYGEN SATURATION: 98 % | HEART RATE: 62 BPM

## 2025-01-15 DIAGNOSIS — M54.41 CHRONIC BILATERAL LOW BACK PAIN WITH BILATERAL SCIATICA: ICD-10-CM

## 2025-01-15 DIAGNOSIS — E55.9 VITAMIN D DEFICIENCY: ICD-10-CM

## 2025-01-15 DIAGNOSIS — E29.1 HYPOGONADISM IN MALE: ICD-10-CM

## 2025-01-15 DIAGNOSIS — Z28.21 IMMUNIZATION REFUSED: ICD-10-CM

## 2025-01-15 DIAGNOSIS — Z23 NEED FOR PNEUMOCOCCAL 20-VALENT CONJUGATE VACCINATION: ICD-10-CM

## 2025-01-15 DIAGNOSIS — K75.81 METABOLIC DYSFUNCTION-ASSOCIATED STEATOHEPATITIS (MASH): ICD-10-CM

## 2025-01-15 DIAGNOSIS — E78.2 MIXED HYPERLIPIDEMIA: ICD-10-CM

## 2025-01-15 DIAGNOSIS — R73.09 ELEVATED HEMOGLOBIN A1C: ICD-10-CM

## 2025-01-15 DIAGNOSIS — Z00.00 WELL ADULT EXAM: Primary | ICD-10-CM

## 2025-01-15 DIAGNOSIS — G89.29 CHRONIC BILATERAL LOW BACK PAIN WITH BILATERAL SCIATICA: ICD-10-CM

## 2025-01-15 DIAGNOSIS — K21.9 GASTROESOPHAGEAL REFLUX DISEASE WITHOUT ESOPHAGITIS: ICD-10-CM

## 2025-01-15 DIAGNOSIS — M54.42 CHRONIC BILATERAL LOW BACK PAIN WITH BILATERAL SCIATICA: ICD-10-CM

## 2025-01-15 DIAGNOSIS — I10 PRIMARY HYPERTENSION: ICD-10-CM

## 2025-01-15 PROCEDURE — 90471 IMMUNIZATION ADMIN: CPT | Performed by: FAMILY MEDICINE

## 2025-01-15 PROCEDURE — 99214 OFFICE O/P EST MOD 30 MIN: CPT | Performed by: FAMILY MEDICINE

## 2025-01-15 PROCEDURE — 99396 PREV VISIT EST AGE 40-64: CPT | Performed by: FAMILY MEDICINE

## 2025-01-15 PROCEDURE — 90677 PCV20 VACCINE IM: CPT | Performed by: FAMILY MEDICINE

## 2025-01-15 RX ORDER — KETOCONAZOLE 20 MG/ML
SHAMPOO, SUSPENSION TOPICAL
COMMUNITY
Start: 2024-12-03

## 2025-01-15 RX ORDER — FLUOROURACIL 50 MG/G
CREAM TOPICAL
COMMUNITY
Start: 2024-12-03

## 2025-01-15 RX ORDER — TESTOSTERONE CYPIONATE 200 MG/ML
INJECTION, SOLUTION INTRAMUSCULAR
COMMUNITY
Start: 2024-11-16 | End: 2025-01-15

## 2025-01-15 RX ORDER — DESONIDE 0.5 MG/G
CREAM TOPICAL
COMMUNITY
Start: 2024-12-03

## 2025-01-15 RX ORDER — CALCIPOTRIENE 50 UG/G
CREAM TOPICAL
COMMUNITY
Start: 2024-12-03

## 2025-01-15 NOTE — PROGRESS NOTES
Male Physical Note      Date: 01/15/2025   Patient Name: Michel Lopez II  : 1965   MRN: [id number]     Chief Complaint:    Chief Complaint   Patient presents with    Annual Exam    Hypertension    Hyperlipidemia       History of Present Illness: Michel Lopez II is a 59 y.o. male who is here today for their annual health maintenance and physical.  Patient also returns for follow-up.    History of Present Illness  The patient is a 59-year-old male who presents for evaluation of weight loss, fatty liver, elevated PSA, pain management, and health maintenance. He is accompanied by his wife.    He has been on a weight loss journey for the past decade, with his maximum weight within the past year being 238 pounds. He reports feeling significantly better following his weight loss. He has not undergone any liver tests or ultrasounds at [facility]. [facility]. He has been making efforts to incorporate exercise into his routine, although not extensively. He is currently on Wegovy, which he reports as effective in suppressing his appetite. However, he experiences occasional nausea and vomiting, approximately once or twice a week. His wife notes that his weight management was more effective on the 1.7 mg dose of Wegovy compared to the 2.4 mg dose, and he experienced less frequent vomiting on the lower dose. The vomiting episodes do not appear to be related to overeating and can occur mid-week, not necessarily immediately after administering the injection.    He has discontinued his testosterone injections for the past 6 weeks due to a slight increase in his PSA levels. He is currently taking Nugenix and is interested in understanding his current testosterone levels. He is on his second bottle of Nugenix and believes it may be beneficial.    He is not experiencing any anxiety or depression. He is not currently taking baclofen but  uses it occasionally. He applies Efudex twice daily and believes it is beneficial. He has not tried alpha lipoic acid. He has been seeing Dr. Parekh at [facility]. He underwent a trial for a lumbar implant, which was only partially successful. He has also undergone ablation, which provided relief for a few weeks.    He has declined both the influenza and pneumonia vaccines.    MEDICATIONS  Current: Wegovy, Nugenix, Efudex, Cymbalta, baclofen (occasionally)    IMMUNIZATIONS  He has received the shingles vaccine.     Patient appears to be doing otherwise well.  They have continue with their medications without any side effects.  They have not had any changes in their usual activity, appetite and sleep.  Patient denies any other cardiovascular, respiratory, gastrointestinal, urologic or neurologic complaints.    Subjective      Review of Systems:   Review of Systems   Constitutional:  Positive for fatigue. Negative for activity change, appetite change, chills and diaphoresis.   HENT:  Negative for congestion, sore throat and swollen glands.    Respiratory:  Negative for cough, chest tightness, shortness of breath and wheezing.    Cardiovascular:  Negative for chest pain, palpitations and leg swelling.   Gastrointestinal:  Positive for nausea and vomiting. Negative for abdominal distention, abdominal pain, blood in stool, constipation, diarrhea, GERD and indigestion.   Genitourinary:  Positive for dysuria. Negative for difficulty urinating, flank pain, frequency, hematuria and urgency.   Musculoskeletal:  Positive for myalgias and neck pain. Negative for arthralgias, back pain, gait problem and joint swelling.   Skin:  Negative for rash.   Neurological:  Positive for weakness and numbness. Negative for dizziness, tremors, seizures, syncope, light-headedness, headache and memory problem.   Psychiatric/Behavioral:  Negative for sleep disturbance and depressed mood. The patient is not nervous/anxious.         Past Medical History, Social History, Family History and Care Team were all reviewed with patient and updated as appropriate.     Medications:     Current Outpatient Medications:     calcipotriene (DOVONEX) 0.005 % cream, , Disp: , Rfl:     desonide (DESOWEN) 0.05 % cream, , Disp: , Rfl:     fluorouracil (EFUDEX) 5 % cream, , Disp: , Rfl:     ketoconazole (NIZORAL) 2 % shampoo, , Disp: , Rfl:     Alpha-Lipoic Acid 300 MG tablet, Take 1 tablet by mouth 3 (Three) Times a Day., Disp: 90 tablet, Rfl: 11    DULoxetine (CYMBALTA) 60 MG capsule, Take 1 capsule by mouth Daily., Disp: , Rfl:     ezetimibe (Zetia) 10 MG tablet, Take 1 tablet by mouth Daily., Disp: , Rfl:     Loratadine 10 MG capsule, Take 1 capsule by mouth Every Morning., Disp: , Rfl:     losartan (COZAAR) 25 MG tablet, Take 0.5 tablets by mouth Daily., Disp: , Rfl:     metoprolol succinate XL (TOPROL-XL) 100 MG 24 hr tablet, 0.5 tablets Daily., Disp: , Rfl:     Multiple Vitamins-Minerals (Multivitamin Adult, Minerals,) tablet, Take 1 tablet by mouth Daily., Disp: , Rfl:     omeprazole (priLOSEC) 20 MG capsule, Take 1 capsule by mouth Daily., Disp: , Rfl:     pravastatin (PRAVACHOL) 40 MG tablet, , Disp: , Rfl:     Semaglutide-Weight Management (Wegovy) 1.7 MG/0.75ML solution auto-injector, Inject 0.75 mL under the skin into the appropriate area as directed 1 (One) Time Per Week., Disp: 3 mL, Rfl: 11    Allergies:   Allergies   Allergen Reactions    Penicillins Hives    Ace Inhibitors Cough       Immunizations:  Health Maintenance Summary            Postponed - INFLUENZA VACCINE (Yearly - July to March) Postponed until 3/31/2025      10/16/2024  Postponed until 3/31/2025 by Jatinder Coulter MD (Patient Refused)    11/03/2023  Postponed until 3/31/2024 by Jatinder Coulter MD (Patient Refused)              Postponed - COVID-19 Vaccine (4 - 2024-25 season) Postponed until 10/16/2025      10/16/2024  Postponed until 10/16/2025 by  Motta, Milagros, MA (Patient Refused)    09/21/2023  Postponed until 9/23/2024 by Jatinder Coulter MD (Patient Refused)    11/19/2021  Imm Admin: COVID-19 (MODERNA) 1st,2nd,3rd Dose Monovalent    04/13/2021  Imm Admin: COVID-19 (PFIZER) Purple Cap Monovalent    03/23/2021  Imm Admin: COVID-19 (PFIZER) Purple Cap Monovalent    Only the first 5 history entries have been loaded, but more history exists.              BMI FOLLOWUP (Yearly) Next due on 10/16/2025      10/16/2024  Registry Metric: BMI Follow-up    09/05/2023  Smart[employer]: WORKFLOW - QUALITY MEASUREMENT - BMI FOLLOW UP CARE PLAN DOCUMENTED    09/05/2023  Smart[employer]: WORKFLOW - QUALITY MEASUREMENT - DOCUMENTED WEIGHT FOLLOW-UP PLAN              LIPID PANEL (Yearly) Next due on 10/30/2025      10/30/2024  Lipid Panel    06/14/2024  Lipid Panel    03/19/2024  Lipid Panel    12/15/2023  Lipid Panel    06/15/2023  Lipid Panel With / Chol / HDL Ratio    Only the first 5 history entries have been loaded, but more history exists.              ANNUAL PHYSICAL (Yearly) Next due on 1/15/2026      01/15/2025  Done    11/03/2023  Done    09/21/2023  Done              COLORECTAL CANCER SCREENING (COLONOSCOPY - Every 5 Years) Tentatively due on 2/5/2026 09/22/2023  Follow-up changed to COLONOSCOPY - Every 5 Years by Jatinder Coulter MD (Tubular adenoma)    02/05/2021  COLONOSCOPY (Done)    02/05/2021  Colonoscopy, Scan              TDAP/TD VACCINES (2 - Td or Tdap) Next due on 5/17/2027 05/17/2017  Imm Admin: Tdap              ZOSTER VACCINE (Series Information) Completed      02/11/2020  Imm Admin: Shingrix    11/12/2019  Imm Admin: Shingrix              HEPATITIS C SCREENING  Completed      09/12/2023  Hepatitis C Antibody              Pneumococcal Vaccine 0-64 (Series Information) Completed      01/15/2025  Postponed until 1/15/2026 by Jatinder Coulter MD (Patient Refused)    01/15/2025  Imm Admin: Pneumococcal Conjugate 20-Valent  "(PCV20)    11/03/2023  Postponed until 11/3/2024 by Jatinder Coulter MD (Patient Refused)                    Orders Placed This Encounter   Procedures    Pneumococcal Conjugate Vaccine 20-Valent All       Colorectal Screening:   Up-to-date.  Last Completed Colonoscopy            COLORECTAL CANCER SCREENING (COLONOSCOPY - Every 5 Years) Tentatively due on 2/5/2026 02/05/2021  COLONOSCOPY (Done)    02/05/2021  Colonoscopy, Scan                  CT for Smoker (Age 50-80, 20pk yr within last 15 years): Patient  have went [facility].  Bone Density/DEXA (high risk): Deferred  Hep C (Age 18-79 once): Previously ordered  HIV (Age 15-65 once) : Deferred  PSA (Over age 50, C Level Recommendation): Ordered  US Aorta (For male smokers, age 65): Deferred  A1c:   Hemoglobin A1C   Date Value Ref Range Status   06/14/2024 5.30 4.80 - 5.60 % Final     Lipid panel: No results found for: \"LABLIPI\"    The 10-year ASCVD risk score (Yuri EPSTEIN, et al., 2019) is: 7.7%    Values used to calculate the score:      Age: 59 years      Sex: Male      Is Non- : No      Diabetic: No      Tobacco smoker: Yes      Systolic Blood Pressure: 102 mmHg      Is BP treated: Yes      HDL Cholesterol: 53 mg/dL      Total Cholesterol: 143 mg/dL    Dermatology: Advised if necessary  Ophthalmologist: Up-to-date  Dentist: Up-to-date.    Tobacco Use: High Risk (1/15/2025)    Patient History     Smoking Tobacco Use: Every Day     Smokeless Tobacco Use: Never     Passive Exposure: Current       Social History     Substance and Sexual Activity   Alcohol Use Not Currently    Alcohol/week: 56.0 standard drinks of alcohol    Comment: 8 shots in a day         Social History     Substance and Sexual Activity   Drug Use Never        Diet/Physical activity: Well-balanced diet/daily exercise.    Sexual health: No issues at present time.    PHQ-2 Depression Screening  PHQ-9 Total Score:      Measures:   Advanced Care Planning: " "  Patient does not have an advance directive, information provided.    Smoking Cessation:   less than 3 minutes spent counseling Will try to cut down     Objective     Physical Exam:  Vital Signs:   Vitals:    01/15/25 1640   BP: 102/74   BP Location: Left arm   Patient Position: Sitting   Cuff Size: Large Adult   Pulse: 62   Resp: 16   Temp: 98.3 °F (36.8 °C)   TempSrc: Temporal   SpO2: 98%   Weight: 94.6 kg (208 lb 9.6 oz)   Height: 172.7 cm (68\")     Body mass index is 31.72 kg/m².     Physical Exam  Vitals and nursing note reviewed.   Constitutional:       Appearance: Normal appearance.   HENT:      Head: Normocephalic and atraumatic.      Nose: Nose normal.      Mouth/Throat:      Pharynx: Oropharynx is clear.   Eyes:      Extraocular Movements: Extraocular movements intact.      Pupils: Pupils are equal, round, and reactive to light.   Neck:      Thyroid: No thyroid mass or thyromegaly.      Trachea: Trachea normal.   Cardiovascular:      Rate and Rhythm: Normal rate and regular rhythm.      Pulses: Normal pulses. No decreased pulses.      Heart sounds: Normal heart sounds.   Pulmonary:      Effort: Pulmonary effort is normal.      Breath sounds: Normal breath sounds.   Abdominal:      General: Abdomen is flat. Bowel sounds are normal.      Palpations: Abdomen is soft.      Tenderness: There is no abdominal tenderness.   Musculoskeletal:      Cervical back: Neck supple.      Right lower leg: No edema.      Left lower leg: No edema.   Lymphadenopathy:      Cervical: No cervical adenopathy.   Skin:     General: Skin is warm and dry.   Neurological:      General: No focal deficit present.      Mental Status: He is alert and oriented to person, place, and time.      Sensory: Sensation is intact.      Motor: Motor function is intact.      Coordination: Coordination is intact.   Psychiatric:         Attention and Perception: Attention normal.         Mood and Affect: Mood normal.         Speech: Speech normal.       "   Behavior: Behavior normal.          POCT Results (if applicable):   Results for orders placed or performed in visit on 01/13/25   PSA DIAGNOSTIC    Collection Time: 01/13/25  4:09 PM    Specimen: Blood   Result Value Ref Range    PSA 0.471 0.000 - 4.000 ng/mL   Comprehensive Metabolic Panel    Collection Time: 01/13/25  4:09 PM    Specimen: Blood   Result Value Ref Range    Glucose 77 65 - 99 mg/dL    BUN 14 8 - 23 mg/dL    Creatinine 1.06 0.76 - 1.27 mg/dL    Sodium 138 136 - 145 mmol/L    Potassium 4.9 3.5 - 5.2 mmol/L    Chloride 103 98 - 107 mmol/L    CO2 26.0 22.0 - 29.0 mmol/L    Calcium 10.0 8.6 - 10.5 mg/dL    Total Protein 7.4 6.0 - 8.5 g/dL    Albumin 4.4 3.5 - 5.2 g/dL    ALT (SGPT) 27 1 - 41 U/L    AST (SGOT) 33 1 - 40 U/L    Alkaline Phosphatase 97 39 - 117 U/L    Total Bilirubin 0.3 0.0 - 1.2 mg/dL    Globulin 3.0 gm/dL    A/G Ratio 1.5 g/dL    BUN/Creatinine Ratio 13.2 7.0 - 25.0    Anion Gap 9.0 5.0 - 15.0 mmol/L    eGFR 80.3 >60.0 mL/min/1.73   CBC (No Diff)    Collection Time: 01/13/25  4:09 PM    Specimen: Blood   Result Value Ref Range    WBC 7.80 3.40 - 10.80 10*3/mm3    RBC 5.04 4.14 - 5.80 10*6/mm3    Hemoglobin 15.4 13.0 - 17.7 g/dL    Hematocrit 46.3 37.5 - 51.0 %    MCV 91.9 79.0 - 97.0 fL    MCH 30.6 26.6 - 33.0 pg    MCHC 33.3 31.5 - 35.7 g/dL    RDW 12.0 (L) 12.3 - 15.4 %    RDW-SD 40.2 37.0 - 54.0 fl    MPV 9.9 6.0 - 12.0 fL    Platelets 246 140 - 450 10*3/mm3       Procedures    Assessment / Plan      Assessment/Plan:   Diagnoses and all orders for this visit:    1. Well adult exam (Primary)   Patient did have a wellness exam performed today that did not reveal any abnormality.  Patient's anxiety/depression scores were reviewed.  We will continue to monitor laboratory data as well as symptomatology and if there are any other questions or concerns prior to the next scheduled follow-up they will contact us.    2. Elevated hemoglobin A1c   Patient does have a history of elevated  hemoglobin A1c.  They have continue with a low carbohydrate/low-fat diet and is attempting 300 minutes of aerobic exercise weekly.  They understand the importance of following this regimen to prevent them from progressing into diabetes.  We will continue to monitor her hemoglobin A1c and if they do progress greater than 7% we will further modify the treatment regiment with medications excetra.    3. Primary hypertension   Patient appears to be tolerating their blood pressure medicine without any side effects.  We will continue to monitor their blood pressure and will adjust to keep there is systolic blood pressure less than 130.  We will continue to monitor renal function and will make adjustments based on these findings.    4. Metabolic dysfunction-associated steatohepatitis (MASH)   Patient does appear to have metabolic dysfunction of your liver secondary to fatty liver disease.  They understands the importance of a low carbohydrate/low-fat diet as well as 300 minutes of aerobic exercise weekly.  We will continue to monitor and will obtain laboratory data intermittently to assure us there is no progression of the disease.  A liver ultrasound as well elastography may be consider intermittently.  Patient understands the importance of weight loss.  There may be an indication of use of a GLP-1 agonist.  Patient has yet to have an ultrasound obtained.  We will try make arrangements for him to have an ultrasound with elastography unless [facility].    5. Mixed hyperlipidemia   Patient does appear to be tolerating their lipid-lowering agent without difficulty.  We will obtain the lipid profile as well as liver function test to observe for any abnormalities.  We will continue to adjust medications to keep their LDL less than 70.    6. Hypogonadism in male   Patient has discontinued the testosterone due to the elevation of his PSA.  PSA has returned back to normal.  He is trying an  over-the-counter supplementation.  We are awaiting the results of testosterone and will pursue and treat accordingly    7. Gastroesophageal reflux disease without esophagitis   Patient is doing well at present time with respect to the reflux symptomatology.  They are tolerating their medications without any complaints at present time.  We will continue to monitor their symptoms and if they develop dysphagia, alarm symptoms or worsening symptomatology further evaluation and treatment may be necessary as well as possible referral for an EGD.    8. Vitamin D deficiency    9. Immunization refused   Patient would benefit from having immunizations given.  Patient has elected not to receive the recommended vaccines at present time.  They understand the risk of not receiving these vaccine and the disease in which they may incur.  We have discussed other options and will pursue with encouragement of compliance with future appointments.  If patient does change their minds prior to the next scheduled follow-up, they may contact us and we will provide immunization at that time.    10. Chronic bilateral low back pain with bilateral sciatica  Patient does continue to have follow-up with the pain specialist.  They have not offered any treatment options at present time.  We will add alpha lipoic acid to the regiment as well as his topical treatment.  We will obtain a copy of the report from the office and will pursue and treat accordingly.  -     Alpha-Lipoic Acid 300 MG tablet; Take 1 tablet by mouth 3 (Three) Times a Day.  Dispense: 90 tablet; Refill: 11    11. Need for pneumococcal 20-valent conjugate vaccination  -     Pneumococcal Conjugate Vaccine 20-Valent All         Healthcare Maintenance:  Counseling provided based on age appropriate USPSTF guidelines.       Michel Lopez II voices understanding and acceptance of this advice and will call back with any further questions or concerns. AVS with preventive healthcare  tips printed for patient.     Follow Up:   Return in about 3 months (around 4/15/2025).    At [facility], we believe that sharing information builds trust and better relationships. You are receiving this note because you recently visited [facility]. It is possible you will see health information before a provider has talked with you about it. This kind of information can be easy to misunderstand. To help you fully understand what it means for your health, we urge you to discuss this note with your provider.    Jatinder Coulter MD  [facility]  Answers submitted by the patient for this visit:  Primary Reason for Visit (Submitted on 1/8/2025)  What is the primary reason for your visit?: Problem Not Listed  Problem not listed (Submitted on 1/8/2025)  Chief Complaint: Other medical problem  Reason for appointment: Follow-up  anorexia: No  joint pain: Yes  change in stool: No  headaches: No  joint swelling: No  vertigo: No  visual change: No  Onset: 1 to 6 months  Chronicity: recurrent  Frequency: intermittently

## 2025-01-20 ENCOUNTER — PATIENT MESSAGE (OUTPATIENT)
Dept: FAMILY MEDICINE CLINIC | Facility: CLINIC | Age: 60
End: 2025-01-20
Payer: COMMERCIAL

## 2025-01-21 ENCOUNTER — TELEPHONE (OUTPATIENT)
Dept: FAMILY MEDICINE CLINIC | Facility: CLINIC | Age: 60
End: 2025-01-21
Payer: COMMERCIAL

## 2025-01-21 LAB
TESTOST FREE MFR SERPL: 0.5 %
TESTOST FREE SERPL-MCNC: 22 PG/ML
TESTOST SERPL-MCNC: 434 NG/DL

## 2025-01-21 NOTE — TELEPHONE ENCOUNTER
Name: Michel Lopez II      Relationship: Self      Best Callback Number:       HUB PROVIDED THE RELAY MESSAGE FROM THE OFFICE  ----- Message from Jatinder Coulter sent at 1/21/2025 12:10 PM EST -----  Testosterone level is acceptable.  Free testosterone levels little low.  Total testosterone is 434 with normal range greater than 18 years of age is 2 64-9 16.  Free testosterone is typically .  It currently is at 22.          PATIENT: VOICED UNDERSTANDING AND HAS NO FURTHER QUESTIONS AT THIS TIME      ADDITIONAL INFORMATION:

## 2025-01-21 NOTE — TELEPHONE ENCOUNTER
----- Message from Jatinder Coulter sent at 1/21/2025 12:10 PM EST -----  Testosterone level is acceptable.  Free testosterone levels little low.  Total testosterone is 434 with normal range greater than 18 years of age is 2 64-9 16.  Free testosterone is typically .  It currently is at 22.

## 2025-01-21 NOTE — TELEPHONE ENCOUNTER
Contacted the patient to discuss results. No answer; voicemail left asking the patient to return the call to discuss results. A telephone encounter was created.      Relay

## 2025-02-01 DIAGNOSIS — R73.09 ELEVATED HEMOGLOBIN A1C: ICD-10-CM

## 2025-02-02 RX ORDER — SEMAGLUTIDE 1.7 MG/.75ML
INJECTION, SOLUTION SUBCUTANEOUS
Qty: 3 ML | Refills: 11 | Status: SHIPPED | OUTPATIENT
Start: 2025-02-02

## 2025-02-28 ENCOUNTER — PATIENT MESSAGE (OUTPATIENT)
Dept: FAMILY MEDICINE CLINIC | Facility: CLINIC | Age: 60
End: 2025-02-28
Payer: COMMERCIAL

## 2025-02-28 DIAGNOSIS — G47.33 OBSTRUCTIVE SLEEP APNEA: Primary | ICD-10-CM

## 2025-03-03 ENCOUNTER — PATIENT MESSAGE (OUTPATIENT)
Dept: FAMILY MEDICINE CLINIC | Facility: CLINIC | Age: 60
End: 2025-03-03
Payer: COMMERCIAL

## 2025-03-04 ENCOUNTER — OFFICE VISIT (OUTPATIENT)
Dept: GASTROENTEROLOGY | Facility: CLINIC | Age: 60
End: 2025-03-04
Payer: COMMERCIAL

## 2025-03-04 VITALS
SYSTOLIC BLOOD PRESSURE: 110 MMHG | DIASTOLIC BLOOD PRESSURE: 60 MMHG | WEIGHT: 202.4 LBS | HEIGHT: 67 IN | BODY MASS INDEX: 31.77 KG/M2

## 2025-03-04 DIAGNOSIS — K74.00 LIVER FIBROSIS: ICD-10-CM

## 2025-03-04 DIAGNOSIS — K57.90 DIVERTICULOSIS: ICD-10-CM

## 2025-03-04 DIAGNOSIS — E66.811 CLASS 1 OBESITY WITH SERIOUS COMORBIDITY AND BODY MASS INDEX (BMI) OF 31.0 TO 31.9 IN ADULT, UNSPECIFIED OBESITY TYPE: ICD-10-CM

## 2025-03-04 DIAGNOSIS — Z86.0100 HISTORY OF COLONIC POLYPS: ICD-10-CM

## 2025-03-04 DIAGNOSIS — K75.81 METABOLIC DYSFUNCTION-ASSOCIATED STEATOHEPATITIS (MASH): Primary | ICD-10-CM

## 2025-03-04 DIAGNOSIS — Z87.898 HISTORY OF ALCOHOL USE: ICD-10-CM

## 2025-03-04 DIAGNOSIS — Z78.9 IMMUNE TO HEPATITIS B: ICD-10-CM

## 2025-03-04 PROCEDURE — 99214 OFFICE O/P EST MOD 30 MIN: CPT | Performed by: NURSE PRACTITIONER

## 2025-03-04 NOTE — PROGRESS NOTES
GASTROENTEROLOGY OFFICE NOTE    Michel Lopez II  [id number]  1965    CARE TEAM  Patient Care Team:  Jatinder Coulter MD as PCP - General (Family Medicine)  Pilar Jones APRN as Nurse Practitioner (Gastroenterology)  Errol Wu MD as Primary Care Provider    Referring Provider: No ref. provider found    Chief Complaint   Patient presents with    Hepatic Disease     6mth follow up        HISTORY OF PRESENT ILLNESS:   Michel Lopez II is a 59 y.o. male who returns for 6 month follow up regarding metabolic associated steatohepatitis, hepatic fibrosis. He also has history of polyps [facility] with [facility]  colon[facility] in 7 years due 2/2028  History of Present Illness  The patient is a 67-year-old male who returns for follow up regarding MASH.     Labs after last office visit have revealed normal AFP, normal INR, normal liver enzymes, CBC without leukopenia, anemia nor thrombocytopenia    He is accompanied by his wife. They again mention consideration for fibroscan.    His weight at last office visit on 9/11/2024 was 228 pounds.  Today's weight 202 pounds 6.4 ounces.    Past Medical History:   Diagnosis Date    Allergic 1980    PCN    Arthritis     shoulders    Chronic back pain     Hyperlipidemia     Hypertension     Liver disease 04/03/2023    Fatty Liver    Low back pain 1998    Obesity 1995    Sleep apnea     Does not use CPAP machine.     Substance abuse 2023    Carl longer drinking (6/2026)    Wears glasses     Wears partial dentures     both upper and lower partials        Past Surgical History:   Procedure Laterality Date    AMPUTATION FINGER / THUMB      reattached  4 fingers including thumb in 12/1993 in work accident    COLONOSCOPY      last colonoscopy was with in last 5 years    FRACTURE SURGERY  12/1993    Right hand multiple reimplantations    INGUINAL HERNIA REPAIR  07/2023    JOINT REPLACEMENT  2020    Left knee 2022 R Shoulder    KNEE  ARTHROPLASTY, PARTIAL REPLACEMENT      left    SHOULDER ARTHROSCOPY      right    SHOULDER ARTHROSCOPY W/ CAPSULAR REPAIR Left 10/12/2021    Procedure: ARTHROSCOPY LEFT SHOULDER WITH ROTATOR CUFF REPAIR;  Surgeon: Obi Hernández Jr., MD;  Location:  [facility];  Service: Orthopedics;  Laterality: Left;    TOTAL SHOULDER ARTHROPLASTY Right 05/24/2022    Procedure: TOTAL REVERSE  SHOULDER ARTHROPLASTY WITH BICEPS TENDONESIS RIGHT;  Surgeon: Obi Hernández Jr., MD;  Location:  [facility];  Service: Orthopedics;  Laterality: Right;    VASECTOMY  2002        Current Outpatient Medications on File Prior to Visit   Medication Sig    Alpha-Lipoic Acid 300 MG tablet Take 1 tablet by mouth 3 (Three) Times a Day.    calcipotriene (DOVONEX) 0.005 % cream     desonide (DESOWEN) 0.05 % cream     DULoxetine (CYMBALTA) 60 MG capsule Take 1 capsule by mouth Daily.    ezetimibe (Zetia) 10 MG tablet Take 1 tablet by mouth Daily.    fluorouracil (EFUDEX) 5 % cream     ketoconazole (NIZORAL) 2 % shampoo     Loratadine 10 MG capsule Take 1 capsule by mouth Every Morning.    losartan (COZAAR) 25 MG tablet Take 0.5 tablets by mouth Daily.    metoprolol succinate XL (TOPROL-XL) 100 MG 24 hr tablet 0.5 tablets Daily.    Multiple Vitamins-Minerals (Multivitamin Adult, Minerals,) tablet Take 1 tablet by mouth Daily.    omeprazole (priLOSEC) 20 MG capsule Take 1 capsule by mouth Daily.    pravastatin (PRAVACHOL) 40 MG tablet     Tirzepatide-Weight Management (ZEPBOUND) 2.5 MG/0.5ML solution auto-injector Inject 0.5 mL under the skin into the appropriate area as directed 1 (One) Time Per Week.    [DISCONTINUED] Wegovy 1.7 MG/0.75ML solution auto-injector INJECT 1.7MG UNDER THE SKIN ONE DAY A WEEK     No current facility-administered medications on file prior to visit.       Allergies   Allergen Reactions    Penicillins Hives    Ace Inhibitors Cough       Family History   Problem Relation Age of Onset    Hypertension Mother     Hyperlipidemia  "Mother     Thyroid disease Mother     Arthritis Mother     Hypertension Father     Arthritis Father     Colon cancer Neg Hx        Social History     Socioeconomic History    Marital status:    Tobacco Use    Smoking status: Every Day     Types: Electronic Cigarette     Passive exposure: Current    Smokeless tobacco: Never    Tobacco comments:     quit 4 years ago   Vaping Use    Vaping status: Every Day    Start date: 5/1/2015    Substances: Nicotine, Flavoring    Devices: Disposable    Passive vaping exposure: Yes   Substance and Sexual Activity    Alcohol use: Not Currently     Alcohol/week: 56.0 standard drinks of alcohol     Comment: 8 shots in a day     Drug use: Never    Sexual activity: Yes     Partners: Female     Birth control/protection: Post-menopausal, Vasectomy       PHYSICAL EXAM   /60 (BP Location: Left arm, Patient Position: Sitting, Cuff Size: Adult)   Ht 170.2 cm (67\")   Wt 91.8 kg (202 lb 6.4 oz)   BMI 31.70 kg/m²   Physical Exam  Constitutional:       General: He is not in acute distress.     Appearance: He is not toxic-appearing.   HENT:      Head: Normocephalic and atraumatic. No contusion.      Right Ear: External ear normal.      Left Ear: External ear normal.   Eyes:      General: Lids are normal. No scleral icterus.        Right eye: No discharge.         Left eye: No discharge.      Extraocular Movements: Extraocular movements intact.   Neck:      Trachea: Trachea normal.      Comments: No visible mass  No visible adenopathy  Pulmonary:      Effort: No respiratory distress.      Comments: Symmetrical expansion    Musculoskeletal:      Comments: Symmetrical movement of upper extremities  Symmetrical movement of lower extremities  No visible deformities   Skin:     General: Skin is warm and dry.      Coloration: Skin is not jaundiced.   Neurological:      General: No focal deficit present.      Mental Status: He is alert and oriented to person, place, and time. "   Psychiatric:         Mood and Affect: Mood normal.         Behavior: Behavior normal.         Thought Content: Thought content normal.     Results Review:  [facility] Monet revealed 2 mm polyp in ascending colon, diverticulosis in left colon.  Recommendation to repeat colonoscopy in 7.  Ascending colon biopsy was tubular adenoma.     3/19/2024 [facility] FibroSure revealed F3, S1, N2.     4/15/2024 comprehensive metabolic panel revealed normal sodium 140.  Normal albumin 4.2.  AST normal 34, ALT normal 35.  Alkaline phosphatase normal 86.  Bilirubin normal 0.3.     5/16/2024 ultrasound revealed medical hepatic parenchymal disease, no mass, no ascites, normal common bile duct. I do not believe elastography was previously performed      4/24/2024 bilirubin 0.4.  INR 0.98.  CBC normal including normal platelet count of 224.  Hepatitis C FibroSure revealed result of 0.64 correlating with stage III bridging fibrosis with many septa.  ALT normal 39.  [facility]     5/24/2024 HCV nonreactive.  Hepatitis A IgG reactive.      6/14/2024 ferritin 44.9.  Iron profile normal with iron saturation 24.  Hepatitis B surface antibody reactive.  Alpha 1 antitrypsin total 101.  Hepatitis A antibody total negative (vaccine recommended per primary care provider's office.  Ceruloplasmin 21.  Hepatitis B surface antigen negative.  Hepatitis B core total antibody negative.  CMP          6/14/2024    15:10 10/30/2024    10:44 1/13/2025    16:09   CMP   Glucose 87  80  77    BUN 17  15  14    Creatinine 0.97  0.98  1.06    EGFR 89.9  88.3  80.3    Sodium 136  138  138    Potassium 4.9  4.7  4.9    Chloride 99  101  103    Calcium 9.9  9.8  10.0    Total Protein 8.0  7.8  7.4    Albumin 4.5  4.3  4.4    Globulin 3.5  3.5  3.0    Total Bilirubin 0.4  0.4  0.3    Alkaline Phosphatase 104  87  97    AST (SGOT) 31  35  33    ALT (SGPT) 29  40  27    Albumin/Globulin Ratio 1.3  1.2  1.5     BUN/Creatinine Ratio 17.5  15.3  13.2    Anion Gap 13.4  11.0  9.0      CBC          6/14/2024    15:10 10/30/2024    10:44 1/13/2025    16:09   CBC   WBC 7.13  6.81  7.80    RBC 4.92  4.84  5.04    Hemoglobin 15.2  14.9  15.4    Hematocrit 46.2  46.3  46.3    MCV 93.9  95.7  91.9    MCH 30.9  30.8  30.6    MCHC 32.9  32.2  33.3    RDW 12.4  12.2  12.0    Platelets 260  288  246      INR          10/30/2024    10:44   Common Labs   INR 0.89        ASSESSMENT / PLAN    Assessment & Plan      1. Metabolic dysfunction-associated steatohepatitis (MASH)  2. Liver fibrosis  3. Immune to hepatitis B  4. History of alcohol use  5. Class 1 obesity with serious comorbidity and body mass index (BMI) of 31.0 to 31.9 in adult, unspecified obesity type  - 3/2024 [facility] fibrosure revealed F3, 4/2024 [facility] ([facility] believe[facility] F3  - we previously discussed it is difficult to know if fibrosure is accurate or potentially underestimating or over estimating fibrosis, liver biopsy could be considered but we are holding on liver biopsy at this time; recommend lab monitoring every 6 months (see below) and consider US at least once per year (consider elastography if BMI is less than 30 as Elastography may be unreliable with BMI greater than 30). If [facility] has access [facility], [facility], I am unable to order fibroscan as I do not have access to a facility that accepts outside order for fibroscan  -  recommend every 6 months to 1 year, check CBC to evaluate platelet count (platelet count less than 120 is concerning for advanced liver disease); check CMP to evaluate liver enzymes and albumin (hypoalbuminemia is concerning for advanced liver disease); check INR (if INR is prolonged this may raise suspicion for advancing liver disease/fibrosis), check AFP and once per year check vitamin D level  - do not currently suspect cirrhosis due to lab results reviewed and US without mention of changes of cirrhosis   - try to avoid  weight gain,   - avoid alcohol   - he has history of elevated liver enzymes but liver enzymes normal since 3/2024, repeat [facility] fibrosure  - we briefly discussed consideration for Rezdiffra. He does not want to proceed with Rezdiffra at this time. With normal liver enzymes, it is okay not to start Rezdiffra. Printed information regarding rezdiffra provided. If he would like to consider Rezdiffra between appointments, notify the office.      Previously discussed the following:  - recommend protein intake of 120 grams per day.   - Avoid NSAIDs such as ibuprofen, Advil, Motrin, diclofenac, meloxicam, naproxen. Limit use of acetaminophen to no more than 2,000 mg per day (patient may take acetaminophen 500 mg q 6 hours as needed for pain).  Avoid alcohol, tobacco and  consider avoiding raw shellfish  - [facility] Fibrosure Plus; Future  - AFP Tumor Marker; Future  6. History of colonic polyps  - due for repeat colonoscopy 2/2028   7. Diverticulosis  - identified at time of colonoscopy in 2021, try to avoid constipation     Return in about 6 months (around 9/4/2025).    Patient or patient representative verbalized consent for the use of Ambient Listening during the visit with  DANIEL Christian for chart documentation. 3/4/2025  10:07 DANIEL Morales  03/04/2025

## 2025-03-06 ENCOUNTER — TELEPHONE (OUTPATIENT)
Dept: FAMILY MEDICINE CLINIC | Facility: CLINIC | Age: 60
End: 2025-03-06
Payer: COMMERCIAL

## 2025-03-11 NOTE — TELEPHONE ENCOUNTER
PA for this medication was denied. Message from CoverMyMeds:    Re: AMBER CHRISTENSEN II,  1965,  [address] [place] [place] [address]  This is to inform you that we have received the medical information that your office has submitted for  the above member’s Zepbound (tirzepatide) request and at this time, the information submitted did  not meet the plan’s criteria for medical necessity due to the following reason:  the use of this medication for any diagnosis other than chronic weight management is  considered an experimental or investigational use for this drug or product. Experimental or  investigational indications are those which further studies or clinical trials are necessary to  determine the maximum tolerated dose, toxicity, safety, or efficacy as compared with the  standard means of treatment. For more information reference your plan brochure.

## 2025-04-07 ENCOUNTER — TELEPHONE (OUTPATIENT)
Dept: FAMILY MEDICINE CLINIC | Facility: CLINIC | Age: 60
End: 2025-04-07
Payer: COMMERCIAL

## 2025-04-07 NOTE — TELEPHONE ENCOUNTER
PA request for Zepbound 25.mg/0.5ml for dx of JINA has been submitted to Cover[employer]eds. Waiting on response.

## 2025-04-08 NOTE — TELEPHONE ENCOUNTER
PA denied per CoverMyMeds for listed medication for JINA. This is the message we received:    This is to inform you that we have received the medical information that your office has submitted for  the above member’s Zepbound (tirzepatide) request and at this time, the information submitted did  not meet the plan’s criteria for medical necessity due to the following reason:  the use of this medication for any diagnosis other than chronic weight management is  considered an experimental or investigational use for this drug or product. Experimental or  investigational indications are those which further studies or clinical trials are necessary to  determine the maximum tolerated dose, toxicity, safety, or efficacy as compared with the  standard means of treatment. For more information reference your plan brochure.    Denial information in media tab of chart.

## 2025-04-14 ENCOUNTER — PATIENT MESSAGE (OUTPATIENT)
Dept: FAMILY MEDICINE CLINIC | Facility: CLINIC | Age: 60
End: 2025-04-14
Payer: COMMERCIAL

## 2025-04-16 ENCOUNTER — OFFICE VISIT (OUTPATIENT)
Dept: FAMILY MEDICINE CLINIC | Facility: CLINIC | Age: 60
End: 2025-04-16
Payer: COMMERCIAL

## 2025-04-16 VITALS
RESPIRATION RATE: 18 BRPM | OXYGEN SATURATION: 96 % | TEMPERATURE: 98 F | HEART RATE: 73 BPM | WEIGHT: 207 LBS | HEIGHT: 67 IN | SYSTOLIC BLOOD PRESSURE: 106 MMHG | DIASTOLIC BLOOD PRESSURE: 70 MMHG | BODY MASS INDEX: 32.49 KG/M2

## 2025-04-16 DIAGNOSIS — Z28.21 IMMUNIZATION REFUSED: ICD-10-CM

## 2025-04-16 DIAGNOSIS — R35.1 BPH ASSOCIATED WITH NOCTURIA: ICD-10-CM

## 2025-04-16 DIAGNOSIS — K21.9 GASTROESOPHAGEAL REFLUX DISEASE WITHOUT ESOPHAGITIS: ICD-10-CM

## 2025-04-16 DIAGNOSIS — N40.1 BPH ASSOCIATED WITH NOCTURIA: ICD-10-CM

## 2025-04-16 DIAGNOSIS — R73.09 ELEVATED HEMOGLOBIN A1C: Primary | ICD-10-CM

## 2025-04-16 DIAGNOSIS — K75.81 METABOLIC DYSFUNCTION-ASSOCIATED STEATOHEPATITIS (MASH): ICD-10-CM

## 2025-04-16 DIAGNOSIS — E78.2 MIXED HYPERLIPIDEMIA: ICD-10-CM

## 2025-04-16 DIAGNOSIS — G47.33 OSA ON CPAP: ICD-10-CM

## 2025-04-16 DIAGNOSIS — I10 PRIMARY HYPERTENSION: ICD-10-CM

## 2025-04-16 PROCEDURE — 99214 OFFICE O/P EST MOD 30 MIN: CPT | Performed by: FAMILY MEDICINE

## 2025-04-16 RX ORDER — AMOXICILLIN 500 MG
CAPSULE ORAL
COMMUNITY
Start: 2025-02-01

## 2025-04-16 RX ORDER — TAMSULOSIN HYDROCHLORIDE 0.4 MG/1
1 CAPSULE ORAL DAILY
Qty: 90 CAPSULE | Refills: 3 | Status: SHIPPED | OUTPATIENT
Start: 2025-04-16

## 2025-04-16 NOTE — PROGRESS NOTES
Follow Up Office Visit      Date: 2025   Patient Name: Michel Lopez II  : 1965   MRN: [id number]     Chief Complaint:    Chief Complaint   Patient presents with    Follow-up    Hypertension       History of Present Illness: Michel Lopez II is a 59 y.o. male who is here today for follow-up.    History of Present Illness  The patient is a 59-year-old male who presents for evaluation of weight loss, sleep apnea, chronic pain, and low blood pressure. He is accompanied by his wife.    A significant weight loss of approximately 40 pounds has been experienced. He is not currently on Zepbound due to an associated cough but expresses interest in resuming it if approved for his sleep apnea.    Diagnosed with sleep apnea, he uses a CPAP machine for management. He was last seen in 2025. [facility], but no response has been received. He is interested in trying new treatments as he feels his current regimen is not effective.    Blood pressure readings at home have been consistently low. Inadequate fluid intake is admitted. Consideration is being given to discontinuing metoprolol. Currently, he is on a regimen of half a tablet of metoprolol 50 mg, having previously discontinued losartan.    Omeprazole is taken for heartburn and reflux, which provides relief. Missing doses exacerbates his symptoms.    He is no longer under the care of a pain specialist and is currently [facility]. Various treatments, including ablation, were unsuccessful. Internal ablation was also tried without relief. A spinal cord stimulator trial was conducted, which was effective for lower back pain but not for thoracic pain. He continues to experience pain and is considering further treatment options. Alpha lipoic acid is taken and found beneficial.    A traumatic injury to his hand in  resulted in the detachment of  all five fingers. The fingers were reattached, and full function was regained within six months.     Patient appears to be doing otherwise well.  They have continue with their medications without any side effects.  They have not had any changes in their usual activity, appetite and sleep.  Patient denies any other cardiovascular, respiratory, gastrointestinal, urologic or neurologic complaints.    Subjective      Review of Systems:   Review of Systems   Constitutional:  Negative for activity change, appetite change and fatigue.   Respiratory:  Negative for cough, chest tightness, shortness of breath and wheezing.    Cardiovascular:  Negative for chest pain, palpitations and leg swelling.   Gastrointestinal:  Negative for abdominal distention, abdominal pain, blood in stool, constipation, diarrhea, nausea, vomiting, GERD and indigestion.   Genitourinary:  Negative for difficulty urinating, dysuria, flank pain, frequency, hematuria and urgency.   Musculoskeletal:  Negative for arthralgias, back pain, gait problem, joint swelling and myalgias.   Neurological:  Negative for dizziness, tremors, seizures, syncope, weakness, light-headedness, numbness, headache and memory problem.   Psychiatric/Behavioral:  Negative for sleep disturbance and depressed mood. The patient is not nervous/anxious.        I have reviewed the patients family history, social history, past medical history, past surgical history and have updated it as appropriate.     Medications:     Current Outpatient Medications:     Alpha-Lipoic Acid 300 MG tablet, Take 1 tablet by mouth 3 (Three) Times a Day., Disp: 90 tablet, Rfl: 11    calcipotriene (DOVONEX) 0.005 % cream, , Disp: , Rfl:     cholecalciferol (D3-50) 1.25 MG (96631 UT) capsule, , Disp: , Rfl:     desonide (DESOWEN) 0.05 % cream, , Disp: , Rfl:     DULoxetine (CYMBALTA) 60 MG capsule, Take 1 capsule by mouth Daily., Disp: , Rfl:     ezetimibe (Zetia) 10 MG tablet, Take 1 tablet by mouth Daily.,  "Disp: , Rfl:     fluorouracil (EFUDEX) 5 % cream, , Disp: , Rfl:     ketoconazole (NIZORAL) 2 % shampoo, , Disp: , Rfl:     Multiple Vitamins-Minerals (Multivitamin Adult, Minerals,) tablet, Take 1 tablet by mouth Daily., Disp: , Rfl:     Omega-3 Fatty Acids (fish oil) 1200 MG capsule capsule, , Disp: , Rfl:     omeprazole (priLOSEC) 20 MG capsule, Take 1 capsule by mouth Daily., Disp: , Rfl:     pravastatin (PRAVACHOL) 40 MG tablet, , Disp: , Rfl:     Specialty Vitamins Products (NERVIVE NERVE RELIEF PO), , Disp: , Rfl:     Tirzepatide-Weight Management (ZEPBOUND) 2.5 MG/0.5ML solution auto-injector, Inject 0.5 mL under the skin into the appropriate area as directed 1 (One) Time Per Week., Disp: 2 mL, Rfl: 0    tamsulosin (FLOMAX) 0.4 MG capsule 24 hr capsule, Take 1 capsule by mouth Daily., Disp: 90 capsule, Rfl: 3    Allergies:   Allergies   Allergen Reactions    Penicillins Hives    Ace Inhibitors Cough       Immunizations:   Immunization History   Administered Date(s) Administered    COVID-19 (MODERNA) 1st,2nd,3rd Dose Monovalent 11/19/2021    COVID-19 (PFIZER) Purple Cap Monovalent 03/23/2021, 04/13/2021    Hepatitis B Adult/Adolescent IM 11/19/2019, 12/23/2019, 05/19/2020    Pneumococcal Conjugate 20-Valent (PCV20) 01/15/2025    Shingrix 11/12/2019, 02/11/2020    Tdap 05/17/2017        Objective     Physical Exam: Please see above  Vital Signs:   Vitals:    04/16/25 1558   BP: 106/70   BP Location: Left arm   Patient Position: Sitting   Cuff Size: Adult   Pulse: 73   Resp: 18   Temp: 98 °F (36.7 °C)   TempSrc: Temporal   SpO2: 96%   Weight: 93.9 kg (207 lb)   Height: 170.2 cm (67.01\")     Body mass index is 32.41 kg/m².  BMI is >= 30 and <35. (Class 1 Obesity). The following options were offered after discussion;: exercise counseling/recommendations and nutrition counseling/recommendations       Physical Exam  Vitals and nursing note reviewed.   Constitutional:       Appearance: Normal appearance.   HENT:     "  Head: Normocephalic and atraumatic.      Nose: Nose normal.      Mouth/Throat:      Pharynx: Oropharynx is clear.   Eyes:      Extraocular Movements: Extraocular movements intact.      Pupils: Pupils are equal, round, and reactive to light.   Neck:      Thyroid: No thyroid mass or thyromegaly.      Trachea: Trachea normal.   Cardiovascular:      Rate and Rhythm: Normal rate and regular rhythm.      Pulses: Normal pulses. No decreased pulses.      Heart sounds: Normal heart sounds.   Pulmonary:      Effort: Pulmonary effort is normal.      Breath sounds: Normal breath sounds.   Abdominal:      General: Abdomen is flat. Bowel sounds are normal.      Palpations: Abdomen is soft.      Tenderness: There is no abdominal tenderness.   Musculoskeletal:      Cervical back: Neck supple.      Right lower leg: No edema.      Left lower leg: No edema.   Lymphadenopathy:      Cervical: No cervical adenopathy.   Skin:     General: Skin is warm and dry.   Neurological:      General: No focal deficit present.      Mental Status: He is alert and oriented to person, place, and time.      Sensory: Sensation is intact.      Motor: Motor function is intact.      Coordination: Coordination is intact.   Psychiatric:         Attention and Perception: Attention normal.         Mood and Affect: Mood normal.         Speech: Speech normal.         Behavior: Behavior normal.         Procedures    Results:   Labs:   Hemoglobin A1C   Date Value Ref Range Status   06/14/2024 5.30 4.80 - 5.60 % Final        POCT Results (if applicable):   Results for orders placed or performed in visit on 01/13/25   Testosterone Free MS / Dialysis    Collection Time: 01/13/25  4:09 PM    Specimen: Blood   Result Value Ref Range    Testosterone, Total 434 ng/dL    Testosterone, Free % 0.5 %    Testosterone, Free 22 (L) pg/mL   PSA DIAGNOSTIC    Collection Time: 01/13/25  4:09 PM    Specimen: Blood   Result Value Ref Range    PSA 0.471 0.000 - 4.000 ng/mL    Comprehensive Metabolic Panel    Collection Time: 01/13/25  4:09 PM    Specimen: Blood   Result Value Ref Range    Glucose 77 65 - 99 mg/dL    BUN 14 8 - 23 mg/dL    Creatinine 1.06 0.76 - 1.27 mg/dL    Sodium 138 136 - 145 mmol/L    Potassium 4.9 3.5 - 5.2 mmol/L    Chloride 103 98 - 107 mmol/L    CO2 26.0 22.0 - 29.0 mmol/L    Calcium 10.0 8.6 - 10.5 mg/dL    Total Protein 7.4 6.0 - 8.5 g/dL    Albumin 4.4 3.5 - 5.2 g/dL    ALT (SGPT) 27 1 - 41 U/L    AST (SGOT) 33 1 - 40 U/L    Alkaline Phosphatase 97 39 - 117 U/L    Total Bilirubin 0.3 0.0 - 1.2 mg/dL    Globulin 3.0 gm/dL    A/G Ratio 1.5 g/dL    BUN/Creatinine Ratio 13.2 7.0 - 25.0    Anion Gap 9.0 5.0 - 15.0 mmol/L    eGFR 80.3 >60.0 mL/min/1.73   CBC (No Diff)    Collection Time: 01/13/25  4:09 PM    Specimen: Blood   Result Value Ref Range    WBC 7.80 3.40 - 10.80 10*3/mm3    RBC 5.04 4.14 - 5.80 10*6/mm3    Hemoglobin 15.4 13.0 - 17.7 g/dL    Hematocrit 46.3 37.5 - 51.0 %    MCV 91.9 79.0 - 97.0 fL    MCH 30.6 26.6 - 33.0 pg    MCHC 33.3 31.5 - 35.7 g/dL    RDW 12.0 (L) 12.3 - 15.4 %    RDW-SD 40.2 37.0 - 54.0 fl    MPV 9.9 6.0 - 12.0 fL    Platelets 246 140 - 450 10*3/mm3       Imaging:   No valid procedures specified.     Measures:   Advanced Care Planning:   Patient does not have an advance directive, information provided.    Smoking Cessation:   Non-smoker.    Assessment / Plan      Assessment/Plan:   Diagnoses and all orders for this visit:    1. Elevated hemoglobin A1c (Primary)   Patient does have a history of elevated hemoglobin A1c.  They have continue with a low carbohydrate/low-fat diet and is attempting 300 minutes of aerobic exercise weekly.  They understand the importance of following this regimen to prevent them from progressing into diabetes.  We will continue to monitor her hemoglobin A1c and if they do progress greater than 7% we will further modify the treatment regiment with medications excetra.    2. Primary hypertension   Patient  appears to be tolerating their blood pressure medicine without any side effects.  We will continue to monitor their blood pressure and will adjust to keep there is systolic blood pressure less than 130.  We will continue to monitor renal function and will make adjustments based on these findings.    3. Metabolic dysfunction-associated steatohepatitis (MASH)   Patient does appear to have metabolic dysfunction of your liver secondary to fatty liver disease.  They understands the importance of a low carbohydrate/low-fat diet as well as 300 minutes of aerobic exercise weekly.  We will continue to monitor and will obtain laboratory data intermittently to assure us there is no progression of the disease.  A liver ultrasound as well elastography may be consider intermittently.  Patient understands the importance of weight loss.  There may be an indication of use of a GLP-1 agonist.    4. Mixed hyperlipidemia   Patient does appear to be tolerating their lipid-lowering agent without difficulty.  We will obtain the lipid profile as well as liver function test to observe for any abnormalities.  We will continue to adjust medications to keep their LDL either less than 70 or 45 based on their cardiovascular risk.    5. Gastroesophageal reflux disease without esophagitis   Patient is doing well at present time with respect to the reflux symptomatology.  They are tolerating their medications without any complaints at present time.  We will continue to monitor their symptoms and if they develop dysphagia, alarm symptoms or worsening symptomatology further evaluation and treatment may be necessary as well as possible referral for an EGD.    6. Immunization refused   Patient would benefit from having immunizations given.  Patient has elected not to receive the recommended vaccines at present time.  They understand the risk of not receiving these vaccine and the disease in which they may incur.  We have discussed other options and  will pursue with encouragement of compliance with future appointments.  If patient does change their minds prior to the next scheduled follow-up, they may contact us and we will provide immunization at that time.    7. JINA on CPAP  Patient is on CPAP.  His BMI is above 30.  FDA has approved the use of Zepbound for treatment of obstructive sleep apnea requiring CPAP with an elevated BMI.  We will initiate a GLP-1 agonist.  He has been on this in the past and has had relatively good success without side effects.  He understands risk and benefits of the medication.  We will pursue and treat currently.  -     Tirzepatide-Weight Management (ZEPBOUND) 2.5 MG/0.5ML solution auto-injector; Inject 0.5 mL under the skin into the appropriate area as directed 1 (One) Time Per Week.  Dispense: 2 mL; Refill: 0    8. BPH associated with nocturia  Patient is having some increase in nocturia with urinary frequency and difficulty with flow excetra since he had been on testosterone.  We have discussed options and will consider pursuing laboratory data in the near future. In the interim, we will start him on Flomax at his bedtime and will monitor his symptoms and will pursue and treat accordingly.-       tamsulosin (FLOMAX) 0.4 MG capsule 24 hr capsule; Take 1 capsule by mouth Daily.  Dispense: 90 capsule; Refill: 3        Follow Up:   Return in about 3 months (around 7/16/2025).      At [facility], we believe that sharing information builds trust and better relationships. You are receiving this note because you recently visited [facility]. It is possible you will see health information before a provider has talked with you about it. This kind of information can be easy to misunderstand. To help you fully understand what it means for your health, we urge you to discuss this note with your provider.    Jatinder Coulter MD  [facility]    Patient or patient representative verbalized consent for the use of Ambient  Listening during the visit with  Jatinder Coulter MD for chart documentation. 4/16/2025  19:43 EDT   Answers submitted by the patient for this visit:  Weight Management (Submitted on 4/14/2025)  Chief Complaint: Weight Management  Weight: decreased  Weight change (lbs): 5  Weight loss treatment: low calorie, low carb diet, prescription medications  Energy level: decreased  Physical activity tolerance: stable  Treatment barriers: adherence to exercise, insurance issues, medication cost, ran out of medications  Additional information: What is available for appetite control?

## 2025-04-21 ENCOUNTER — TELEPHONE (OUTPATIENT)
Dept: FAMILY MEDICINE CLINIC | Facility: CLINIC | Age: 60
End: 2025-04-21
Payer: COMMERCIAL

## 2025-04-21 NOTE — TELEPHONE ENCOUNTER
PA request for Zepbound 2.5mg/0.5ml for obesity has been submitted to [employer]. Waiting on response.

## 2025-04-21 NOTE — TELEPHONE ENCOUNTER
PA denied for Zepbound for weight mgmt or JINA. Message from Cover[employer]eds:     This is to inform you that we have received the medical information that your office has submitted for  the above member’s Zepbound (tirzepatide) request and at this time, the information submitted did  not meet the plan’s criteria for medical necessity due to the following reason:  the use of this medication without inadequate treatment response, intolerance, or  contraindication to at least TWO oral medications for weight management (e.g.,  benzphetamine, diethylpropion, phentermine, Qsymia, etc.) does not establish medical  necessity for this drug.

## 2025-04-22 ENCOUNTER — PATIENT MESSAGE (OUTPATIENT)
Dept: FAMILY MEDICINE CLINIC | Facility: CLINIC | Age: 60
End: 2025-04-22
Payer: COMMERCIAL

## 2025-05-04 ENCOUNTER — PATIENT MESSAGE (OUTPATIENT)
Dept: FAMILY MEDICINE CLINIC | Facility: CLINIC | Age: 60
End: 2025-05-04
Payer: COMMERCIAL

## 2025-07-21 ENCOUNTER — OFFICE VISIT (OUTPATIENT)
Dept: FAMILY MEDICINE CLINIC | Facility: CLINIC | Age: 60
End: 2025-07-21
Payer: COMMERCIAL

## 2025-07-21 VITALS
HEIGHT: 67 IN | DIASTOLIC BLOOD PRESSURE: 78 MMHG | HEART RATE: 92 BPM | BODY MASS INDEX: 32.96 KG/M2 | TEMPERATURE: 97.7 F | RESPIRATION RATE: 16 BRPM | OXYGEN SATURATION: 95 % | WEIGHT: 210 LBS | SYSTOLIC BLOOD PRESSURE: 128 MMHG

## 2025-07-21 DIAGNOSIS — Z28.21 IMMUNIZATION REFUSED: ICD-10-CM

## 2025-07-21 DIAGNOSIS — G47.33 OSA ON CPAP: ICD-10-CM

## 2025-07-21 DIAGNOSIS — K75.81 METABOLIC DYSFUNCTION-ASSOCIATED STEATOHEPATITIS (MASH): ICD-10-CM

## 2025-07-21 DIAGNOSIS — K21.9 GASTROESOPHAGEAL REFLUX DISEASE WITHOUT ESOPHAGITIS: ICD-10-CM

## 2025-07-21 DIAGNOSIS — R73.09 ELEVATED HEMOGLOBIN A1C: ICD-10-CM

## 2025-07-21 DIAGNOSIS — I10 PRIMARY HYPERTENSION: Primary | ICD-10-CM

## 2025-07-21 DIAGNOSIS — E78.2 MIXED HYPERLIPIDEMIA: ICD-10-CM

## 2025-07-21 PROCEDURE — 99214 OFFICE O/P EST MOD 30 MIN: CPT | Performed by: FAMILY MEDICINE

## 2025-07-21 RX ORDER — METOPROLOL SUCCINATE 100 MG/1
TABLET, EXTENDED RELEASE ORAL
COMMUNITY
Start: 2025-07-17 | End: 2025-07-21

## 2025-07-21 RX ORDER — LOSARTAN POTASSIUM 25 MG/1
TABLET ORAL
COMMUNITY
Start: 2025-04-29 | End: 2025-07-21

## 2025-07-21 NOTE — PROGRESS NOTES
Follow Up Office Visit      Date: 2025   Patient Name: Michel Lopez II  : 1965   MRN: [id number]     Chief Complaint:    Chief Complaint   Patient presents with    Follow-up       History of Present Illness: Michel Lopez II is a 60 y.o. male who is here today for follow up.    History of Present Illness  Patient appears to be doing otherwise well.  They have continue with their medications without any side effects.  They have not had any changes in their usual activity, appetite and sleep.  Patient denies any other cardiovascular, respiratory, gastrointestinal, urologic or neurologic complaints.      The patient presents for evaluation of back pain, obstructive sleep apnea, and nonalcoholic steatohepatitis.    [facility] A trial of e-stim implant was conducted, which provided approximately 80 percent relief for his lower back pain. However, it did not alleviate his thoracic pain. He is currently exploring other treatment options. He has discontinued duloxetine as it was ineffective in managing his pain, and its cessation did not exacerbate his pain. He continues to use topical creams and take vitamin D supplements. He also takes alpha lipoic acid but is unsure of its effectiveness in relieving nerve pain.    He underwent a sleep study through a third party and uses a CPAP machine for his obstructive sleep apnea. He is unsure of his Apneic Hypoxic Index (AHI) from the sleep study.    He is under the care of a gastroenterologist at [facility]. His last consultation with the gastroenterologist was in 2024, during which he weighed 228 pounds. By 2025, his weight had decreased to 202 pounds.    He has an upcoming appointment on 2025 for his annual check-up, during which his PSA levels will be checked. He [facility] not [facility] any [facility]. He has discontinued Flomax as he no longer requires it and  reports no issues with urination or flow. He does not experience frequent nighttime urination. He is not taking metoprolol. His blood pressure remains stable, and he does not experience palpitations, chest pain, or shortness of breath. His energy levels are satisfactory. He continues to take Zetia, losartan, omeprazole, pravastatin, Xyzal (over the counter), and Coenzyme Q10. He experiences heartburn if he misses a dose of omeprazole.    Social History:  Sleep: Uses CPAP machine for obstructive sleep apnea     Patient appears to be doing otherwise well.  They have continue with their medications without any side effects.  They have not had any changes in their usual activity, appetite and sleep.  Patient denies any other cardiovascular, respiratory, gastrointestinal, urologic or neurologic complaints.    Subjective      Review of Systems:   Review of Systems   Constitutional:  Negative for activity change, appetite change and fatigue.   Respiratory:  Negative for cough, chest tightness, shortness of breath and wheezing.    Cardiovascular:  Negative for chest pain, palpitations and leg swelling.   Gastrointestinal:  Negative for abdominal distention, abdominal pain, blood in stool, constipation, diarrhea, nausea, vomiting, GERD and indigestion.   Genitourinary:  Negative for difficulty urinating, dysuria, flank pain, frequency, hematuria and urgency.   Musculoskeletal:  Negative for arthralgias, back pain, gait problem, joint swelling and myalgias.   Neurological:  Negative for dizziness, tremors, seizures, syncope, weakness, light-headedness, numbness, headache and memory problem.   Psychiatric/Behavioral:  Negative for sleep disturbance and depressed mood. The patient is not nervous/anxious.        I have reviewed the patients family history, social history, past medical history, past surgical history and have updated it as appropriate.     Medications:     Current Outpatient Medications:     Tirzepatide-Weight  "Management (ZEPBOUND) 2.5 MG/0.5ML solution auto-injector, Inject 0.5 mL under the skin into the appropriate area as directed 1 (One) Time Per Week., Disp: 2 mL, Rfl: 0    Alpha-Lipoic Acid 300 MG tablet, Take 1 tablet by mouth 3 (Three) Times a Day., Disp: 90 tablet, Rfl: 11    calcipotriene (DOVONEX) 0.005 % cream, , Disp: , Rfl:     cholecalciferol (D3-50) 1.25 MG (15839 UT) capsule, , Disp: , Rfl:     desonide (DESOWEN) 0.05 % cream, , Disp: , Rfl:     ezetimibe (Zetia) 10 MG tablet, Take 1 tablet by mouth Daily., Disp: , Rfl:     fluorouracil (EFUDEX) 5 % cream, , Disp: , Rfl:     ketoconazole (NIZORAL) 2 % shampoo, , Disp: , Rfl:     Multiple Vitamins-Minerals (Multivitamin Adult, Minerals,) tablet, Take 1 tablet by mouth Daily., Disp: , Rfl:     Omega-3 Fatty Acids (fish oil) 1200 MG capsule capsule, , Disp: , Rfl:     omeprazole (priLOSEC) 20 MG capsule, Take 1 capsule by mouth Daily., Disp: , Rfl:     pravastatin (PRAVACHOL) 40 MG tablet, , Disp: , Rfl:     Specialty Vitamins Products (NERVIVE NERVE RELIEF PO), , Disp: , Rfl:     Allergies:   Allergies   Allergen Reactions    Penicillins Hives    Ace Inhibitors Cough       Immunizations:   Immunization History   Administered Date(s) Administered    COVID-19 (MODERNA) 1st,2nd,3rd Dose Monovalent 11/19/2021    COVID-19 (PFIZER) Purple Cap Monovalent 03/23/2021, 04/13/2021    Hepatitis B Adult/Adolescent IM 11/19/2019, 12/23/2019, 05/19/2020    Pneumococcal Conjugate 20-Valent (PCV20) 01/15/2025    Shingrix 11/12/2019, 02/11/2020    Tdap 05/17/2017        Objective     Physical Exam: Please see above  Vital Signs:   Vitals:    07/21/25 1657   BP: 128/78   BP Location: Left arm   Patient Position: Sitting   Cuff Size: Adult   Pulse: 92   Resp: 16   Temp: 97.7 °F (36.5 °C)   TempSrc: Temporal   SpO2: 95%   Weight: 95.3 kg (210 lb)   Height: 170.2 cm (67.01\")     Body mass index is 32.88 kg/m².          Physical Exam  Vitals and nursing note reviewed. "   Constitutional:       Appearance: Normal appearance.   HENT:      Head: Normocephalic and atraumatic.      Nose: Nose normal.      Mouth/Throat:      Pharynx: Oropharynx is clear.   Eyes:      Extraocular Movements: Extraocular movements intact.      Pupils: Pupils are equal, round, and reactive to light.   Neck:      Thyroid: No thyroid mass or thyromegaly.      Trachea: Trachea normal.   Cardiovascular:      Rate and Rhythm: Normal rate and regular rhythm.      Pulses: Normal pulses. No decreased pulses.      Heart sounds: Normal heart sounds.   Pulmonary:      Effort: Pulmonary effort is normal.      Breath sounds: Normal breath sounds.   Abdominal:      General: Abdomen is flat. Bowel sounds are normal.      Palpations: Abdomen is soft.      Tenderness: There is no abdominal tenderness.   Musculoskeletal:      Cervical back: Neck supple.      Right lower leg: No edema.      Left lower leg: No edema.   Lymphadenopathy:      Cervical: No cervical adenopathy.   Skin:     General: Skin is warm and dry.   Neurological:      General: No focal deficit present.      Mental Status: He is alert and oriented to person, place, and time.      Sensory: Sensation is intact.      Motor: Motor function is intact.      Coordination: Coordination is intact.   Psychiatric:         Attention and Perception: Attention normal.         Mood and Affect: Mood normal.         Speech: Speech normal.         Behavior: Behavior normal.         Procedures    Results:   Labs:   Hemoglobin A1C   Date Value Ref Range Status   06/14/2024 5.30 4.80 - 5.60 % Final        POCT Results (if applicable):   Results for orders placed or performed in visit on 01/13/25   Testosterone Free MS / Dialysis    Collection Time: 01/13/25  4:09 PM    Specimen: Blood   Result Value Ref Range    Testosterone, Total 434 ng/dL    Testosterone, Free % 0.5 %    Testosterone, Free 22 (L) pg/mL   PSA DIAGNOSTIC    Collection Time: 01/13/25  4:09 PM    Specimen: Blood    Result Value Ref Range    PSA 0.471 0.000 - 4.000 ng/mL   Comprehensive Metabolic Panel    Collection Time: 01/13/25  4:09 PM    Specimen: Blood   Result Value Ref Range    Glucose 77 65 - 99 mg/dL    BUN 14 8 - 23 mg/dL    Creatinine 1.06 0.76 - 1.27 mg/dL    Sodium 138 136 - 145 mmol/L    Potassium 4.9 3.5 - 5.2 mmol/L    Chloride 103 98 - 107 mmol/L    CO2 26.0 22.0 - 29.0 mmol/L    Calcium 10.0 8.6 - 10.5 mg/dL    Total Protein 7.4 6.0 - 8.5 g/dL    Albumin 4.4 3.5 - 5.2 g/dL    ALT (SGPT) 27 1 - 41 U/L    AST (SGOT) 33 1 - 40 U/L    Alkaline Phosphatase 97 39 - 117 U/L    Total Bilirubin 0.3 0.0 - 1.2 mg/dL    Globulin 3.0 gm/dL    A/G Ratio 1.5 g/dL    BUN/Creatinine Ratio 13.2 7.0 - 25.0    Anion Gap 9.0 5.0 - 15.0 mmol/L    eGFR 80.3 >60.0 mL/min/1.73   CBC (No Diff)    Collection Time: 01/13/25  4:09 PM    Specimen: Blood   Result Value Ref Range    WBC 7.80 3.40 - 10.80 10*3/mm3    RBC 5.04 4.14 - 5.80 10*6/mm3    Hemoglobin 15.4 13.0 - 17.7 g/dL    Hematocrit 46.3 37.5 - 51.0 %    MCV 91.9 79.0 - 97.0 fL    MCH 30.6 26.6 - 33.0 pg    MCHC 33.3 31.5 - 35.7 g/dL    RDW 12.0 (L) 12.3 - 15.4 %    RDW-SD 40.2 37.0 - 54.0 fl    MPV 9.9 6.0 - 12.0 fL    Platelets 246 140 - 450 10*3/mm3       Imaging:   No valid procedures specified.     Measures:   Advanced Care Planning:   Patient does not have an advance directive, information provided.    Smoking Cessation:   Non-smoker.    Assessment / Plan      Assessment/Plan:   Diagnoses and all orders for this visit:    1. Primary hypertension (Primary)   Patient appears to be doing well without medications since he has lost the weight..  We will continue to monitor their blood pressure and will adjust to keep there is systolic blood pressure less than 130.  We will continue to monitor renal function and will make adjustments based on these findings.    2. Metabolic dysfunction-associated steatohepatitis (MASH)   Patient does appear to have metabolic dysfunction of  your liver secondary to fatty liver disease.  They understands the importance of a low carbohydrate/low-fat diet as well as 300 minutes of aerobic exercise weekly.  We will continue to monitor and will obtain laboratory data intermittently to assure us there is no progression of the disease.  A liver ultrasound as well elastography may be consider intermittently.  Patient understands the importance of weight loss.  There may be an indication of use of a GLP-1 agonist.  He does have a follow-up with GI in a couple of weeks.  [facility] Diaz FibroSure test as well as laboratory data.    3. Mixed hyperlipidemia   Patient does appear to be tolerating their lipid-lowering agent without difficulty.  We will obtain the lipid profile as well as liver function test to observe for any abnormalities.  We will continue to adjust medications to keep their LDL less than 70.  [facility]    4. Gastroesophageal reflux disease without esophagitis   Patient is doing well at present time with respect to the reflux symptomatology.  They are tolerating their medications without any complaints at present time.  We will continue to monitor their symptoms and if they develop dysphagia, alarm symptoms or worsening symptomatology further evaluation and treatment may be necessary as well as possible referral for an EGD.    5. Elevated hemoglobin A1c   Patient does have a history of elevated hemoglobin A1c.  They have continue with a low carbohydrate/low-fat diet and is attempting 300 minutes of aerobic exercise weekly.  They understand the importance of following this regimen to prevent them from progressing into diabetes.  We will continue to monitor her hemoglobin A1c and if they do progress greater than 7% we will further modify the treatment regiment with medications excetra.  [facility] will [facility] hemoglobin A1c.      6. JINA on CPAP  Patient does continue to have sleep apnea and  he uses CPAP.  He does not know what his AHI score is.  We have discussed that when he lost weight initially with a GLP-1 agonist that he showed improvement of symptoms but has unfortunately gained some of that weight back.  He does continue have a BMI around 33.  We have encouraged him to restart the Zepbound.  He understands the risk and benefits and that it is FDA approved for sleep apnea.  -     Tirzepatide-Weight Management (ZEPBOUND) 2.5 MG/0.5ML solution auto-injector; Inject 0.5 mL under the skin into the appropriate area as directed 1 (One) Time Per Week.  Dispense: 2 mL; Refill: 0    7. Immunization refused   Patient would benefit from having immunizations given.  Patient has elected not to receive the recommended vaccines at present time.  They understand the risk of not receiving these vaccine and the disease in which they may incur.  We have discussed other options and will pursue with encouragement of compliance with future appointments.  If patient does change their minds prior to the next scheduled follow-up, they may contact us and we will provide immunization at that time.      Follow Up:   Return in about 6 months (around 1/21/2026).      At [facility], we believe that sharing information builds trust and better relationships. You are receiving this note because you recently visited [facility]. It is possible you will see health information before a provider has talked with you about it. This kind of information can be easy to misunderstand. To help you fully understand what it means for your health, we urge you to discuss this note with your provider.    Jatinder Coulter MD  [facility]    Patient or patient representative verbalized consent for the use of Ambient Listening during the visit with  Jatinder Coulter MD for chart documentation. 7/21/2025  17:11 EDT   Answers submitted by the patient for this visit:  Chronic Condition Follow-up (Submitted on  7/20/2025)  Chief Complaint: PCP follow-up  other: Yes  Medication compliance: most of the time  Treatment barriers: medication cost, lack of exercise  Exercise: rarely

## 2025-07-23 ENCOUNTER — TELEPHONE (OUTPATIENT)
Dept: FAMILY MEDICINE CLINIC | Facility: CLINIC | Age: 60
End: 2025-07-23
Payer: COMMERCIAL

## 2025-07-23 NOTE — TELEPHONE ENCOUNTER
Detail Level: Zone PA request for Zepbound 2.5mg/0.5ml will be on hold until we received patients result for sleep study report with AHI information. Spoke with patient about obtaining sleep study and informed him that this PA will be on hold until we receive those records.   Sunscreen Recommendations: Aveeno sunscreen 50+ SPF and Coolibar Sun-protective clothes UPF 50+ Detail Level: Detailed

## 2025-07-31 ENCOUNTER — PATIENT MESSAGE (OUTPATIENT)
Dept: FAMILY MEDICINE CLINIC | Facility: CLINIC | Age: 60
End: 2025-07-31
Payer: COMMERCIAL

## 2025-08-01 NOTE — TELEPHONE ENCOUNTER
PA approved for listed medication. Message from Cover[employer]s:    This is to inform you that your Prior Authorization request for the above member’s Zepbound  2.5MG/0.5ML SC SOAJ has been approved. If you are changing the member's therapy the  previously approved therapy will be canceled and replaced.  The authorization is valid from 07/02/2025 through 01/28/2026. A letter of explanation will also be  mailed to the patient.  Approvals may be subject to dosing limits in accordance with FDA approved labeling, accepted  compendia, evidence-based practice guidelines or your prescription drug plan benefits. You can go to  [email or web address], scroll down and select downloaded here, and find the  medication to see if there are dosing limits.  This PA determination only applies to medications dispensed by a pharmacy and billed to the  member’s pharmacy benefit.

## 2025-08-24 ENCOUNTER — PATIENT MESSAGE (OUTPATIENT)
Dept: FAMILY MEDICINE CLINIC | Facility: CLINIC | Age: 60
End: 2025-08-24
Payer: COMMERCIAL

## (undated) DEVICE — SOL IRR LACT RNG BG 3000ML

## (undated) DEVICE — SST TWIST DRILL, STANDARD, 2MM DIA. X 127MM: Brand: MICROAIRE®

## (undated) DEVICE — 3M™ MEDIPORE™ H SOFT CLOTH SURGICAL TAPE, 2863, 3 IN X 10 YD, 12/CASE: Brand: 3M™ MEDIPORE™

## (undated) DEVICE — PK MAJ SHLDR SPLT 10

## (undated) DEVICE — PULLOVER TOGA, 2X LARGE: Brand: FLYTE, SURGICOOL

## (undated) DEVICE — GLV SURG PREMIERPRO GAMMEX NEOPRN PF SZ8 GRN

## (undated) DEVICE — ELECTRD BLD EZ CLN STD 4IN

## (undated) DEVICE — 3M™ STERI-STRIP™ REINFORCED ADHESIVE SKIN CLOSURES, R1546, 1/4 IN X 4 IN (6 MM X 100 MM), 10 STRIPS/ENVELOPE: Brand: 3M™ STERI-STRIP™

## (undated) DEVICE — GLV SURG PREMIERPRO MIC LTX PF SZ8 BRN

## (undated) DEVICE — 4.5 MM FULL RADIUS STRAIGHT                                    BLADES, POWER/EP-1, YELLOW, PACKAGED                                    6 PER BOX, STERILE: Brand: DYONICS

## (undated) DEVICE — SYR SLP TP 10ML DISP

## (undated) DEVICE — PATIENT RETURN ELECTRODE, SINGLE-USE, CONTACT QUALITY MONITORING, ADULT, WITH 9FT CORD, FOR PATIENTS WEIGING OVER 33LBS. (15KG): Brand: MEGADYNE

## (undated) DEVICE — TBG PENCL TELESCP MEGADYNE SMOKE EVAC 10FT

## (undated) DEVICE — T-MAX DISPOSABLE FACE MASK 8 PER BOX

## (undated) DEVICE — SLNG ULTRSLING3 13TO15IN LG

## (undated) DEVICE — GLV SURG SENSICARE PI MIC PF SZ7 LF STRL

## (undated) DEVICE — PK ARTHSCP SHLDR 10

## (undated) DEVICE — TRAP FLD MINIVAC MEGADYNE 100ML

## (undated) DEVICE — SUT MONOCRYL PLS ANTIB UND 3/0  PS1 27IN

## (undated) DEVICE — DRSNG GZ PETROLTM XEROFORM CURAD 1X8IN STRL

## (undated) DEVICE — DRSNG PAD ABD 8X10IN STRL

## (undated) DEVICE — PUMP PAIN AUTOFUSER AUTO SELCT NOBOLUS 1TO14ML/HR 550ML DISP

## (undated) DEVICE — 3M™ STERI-DRAPE™ U-DRAPE 1015: Brand: STERI-DRAPE™

## (undated) DEVICE — BLANKT WARM LOWR/BDY 100X120CM

## (undated) DEVICE — BNDG ELAS CO-FLEX SLF ADHR 4IN5YD LF STRL

## (undated) DEVICE — EXPRESSEW III SUTURE NEEDLE FOR USE WITH EXPRESSEW II OR III SUTURE PASSER: Brand: EXPRESSEW

## (undated) DEVICE — ANTIBACTERIAL UNDYED BRAIDED (POLYGLACTIN 910), SYNTHETIC ABSORBABLE SUTURE: Brand: COATED VICRYL

## (undated) DEVICE — GLV SURG PREMIERPRO MIC LTX PF SZ7 BRN

## (undated) DEVICE — THREADED CLEAR CANNULA WITH OBTURATOR 7MM X 75MM

## (undated) DEVICE — BLD CUT FORMLA RESECTOR 4.0MM

## (undated) DEVICE — VAPR COOLPULSE 90 ELECTRODE WITH HAND CONTROLS 90 DEGREES SUCTION WITH INTEGRATED HANDPIECE: Brand: VAPR COOLPULSE

## (undated) DEVICE — DYONICS 25 INFLOW TUBE SET, 3 PER BOX

## (undated) DEVICE — 3 BONE CEMENT MIXER: Brand: MIXEVAC

## (undated) DEVICE — STRYKER PERFORMANCE SERIES SAGITTAL BLADE: Brand: STRYKER PERFORMANCE SERIES

## (undated) DEVICE — COATED BRAIDED POLYESTER: Brand: TI-CRON

## (undated) DEVICE — SHOULDER STABILIZATION KIT,                                    DISPOSABLE 12 PER BOX

## (undated) DEVICE — SYS CLS SKIN PREMIERPRO EXOFINFUSION 22CM

## (undated) DEVICE — HANDPIECE SET WITH HIGH FLOW TIP AND SUCTION TUBE: Brand: INTERPULSE